# Patient Record
Sex: FEMALE | Race: ASIAN | Employment: FULL TIME | URBAN - METROPOLITAN AREA
[De-identification: names, ages, dates, MRNs, and addresses within clinical notes are randomized per-mention and may not be internally consistent; named-entity substitution may affect disease eponyms.]

---

## 2017-03-07 RX ORDER — METHIMAZOLE 5 MG/1
5 TABLET ORAL 2 TIMES DAILY
COMMUNITY

## 2017-03-07 RX ORDER — ACETAMINOPHEN 325 MG/1
325 TABLET ORAL
COMMUNITY

## 2017-03-07 RX ORDER — CETIRIZINE HCL 10 MG
10 TABLET ORAL DAILY
COMMUNITY

## 2017-03-07 NOTE — PERIOP NOTES
[facility]  Ambulatory Surgery Unit  Pre-operative Instructions    Surgery/Procedure Date  03/13/2017            Tentative Arrival Time 0700      1. On the day of your surgery/procedure, please report to the Ambulatory Surgery Unit Registration Desk and sign in at your designated time. The Ambulatory Surgery Unit is located in [facility] on the [facility] side of the [facility] across from the [address] [place] [address]. Please have all of your health insurance cards and a photo ID. 2. You must have someone with you to drive you home, as you should not drive a car for 24 hours following anesthesia. Please make arrangements for a responsible adult friend or family member to stay with you for at least the first 24 hours after your surgery. 3. Do not have anything to eat or drink (including water, gum, mints, coffee, juice) after midnight   03/12/2017  . This may not apply to medications prescribed by your physician. (Please note below the special instructions with medications to take the morning of surgery, if applicable.)    4. We recommend you do not drink any alcoholic beverages for 24 hours before and after your surgery. 5. Stop all Aspirin and non-steroidal anti-inflammatory drugs (i.e. Advil, Aleve) as directed by your surgeon's office. Stop all vitamins and herbal supplements seven days prior to your surgery. If you are currently taking Plavix, Coumadin, or other blood-thinning agents, contact your surgeon for instructions. 6. In an effort to help prevent surgical site infection, we ask that you shower with an anti-bacterial soap (i.e. Dial or Safeguard) for 3 days prior to and on the morning of surgery, using a fresh towel after each shower. Do not apply any lotions, powders or deodorants after the shower on the day of your procedure. If applicable, please do not shave the operative site for 48 hours prior to surgery. 7. Wear comfortable clothes.   Wear glasses instead of contacts. Do not bring any money or jewelry. Do not wear make-up, particularly mascara, the morning of your surgery. Do not wear nail polish, particularly if you are having foot /hand surgery. Wear your hair loose or down, no ponytails, buns, ryne pins or clips. All body piercings must be removed. 8. You should understand that if you do not follow these instructions your surgery may be cancelled. If your physical condition changes (i.e. fever, cold or flu) please contact your surgeon as soon as possible. 9. It is important that you be on time. If a situation occurs where you may be late, or if you have any questions or problems, please call [phone].    10. Your surgery time may be subject to change. You will receive a phone call the day prior to surgery to confirm your arrival time. 11. Pediatric patients: please bring a change of clothes, diapers, bottle/sippy cup, pacifier, etc.      Special Instructions:    MEDICATIONS TO TAKE THE MORNING OF SURGERY WITH A SIP OF WATER: Zyrtec, Tapazole      I understand a pre-operative phone call will be made to verify my surgery time. In the event that I am not available, I give permission for a message to be left on my answering service and/or with another person? yes         ___________________      ___________________  _________  Pt and spouse verbalized understanding of preop instructions via telephone.   (Signature of Patient)          (Witness)                              (Date and Time)

## 2017-03-10 ENCOUNTER — ANESTHESIA EVENT (OUTPATIENT)
Dept: SURGERY | Age: 47
End: 2017-03-10
Payer: COMMERCIAL

## 2017-03-11 PROBLEM — D25.9 LEIOMYOMA OF BODY OF UTERUS: Status: ACTIVE | Noted: 2017-03-11

## 2017-03-11 PROBLEM — R97.1 ELEVATED CA-125: Status: ACTIVE | Noted: 2017-03-11

## 2017-03-11 PROBLEM — R10.2 PELVIC PAIN IN FEMALE: Status: ACTIVE | Noted: 2017-03-11

## 2017-03-12 NOTE — H&P
Visit Type:  preop  Primary Provider:  Chang Monsalve MD    CC:  preop for Laparoscopy and Left salpingoophorectomy and right salpingectomy. History of Present Illness:  50year old presents for pre op for Laparoscopy, Left salpingoophorectomy and right salpingectomy. Has  had some LLQ pain recently with menses. Had spotting in January and no period in February. Plan and risk complications and inability to perform surgery as posted discussed at length through [place]  \"[facility]ortencia\". NKA to meds  Accepts blood transfusion if needed  Agrees to photos      Vital Signs   50Years Old Female  Height:  62.5 inches  Weight: 116.7 pounds  BP:       118/74    Past Pregnancy History   : 0  Para:     0  Aborta:  0  Term: 0, Premature: 0, Living Children: 0, Vaginal Deliveries: 0, C-Sections: 0, Elect. Ab: 0, Ectopics: 0    Gynecologic History   Additional Menses Information: spotting in February  Does patient have any problems with urine leakage? no  Does patient have any other bladder problems? no  History of abnormal pap: no  Gardasil Injection History: Not Applicable  Pt currently sexually active: yes  Current Contraception: None. History of STD: no     Allergies    This patient has no known allergies. 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 Occasional LLQ pain      General Medical Physical Exam:     General Appearance:       well developed, well nourished, in no acute distress    Head: Inspection:  normocephalic without obvious abnormalities    Eyes:        External:  EOM intact    Ears, Nose, Throat:        External:  normocephalic and atraumatic       Hearing:  grossly intact    Neck:        Neck:  supple; no masses; trachea midline       Thyroid:  no nodules, masses, tenderness, or enlargement    Respiratory:        Resp. effort:  no use of accessory muscles       Auscultation:   no rales, rhonchi, or wheezes    Chest Wall:       Breast (insp.): chronic inverted right nipple       Breast (palp.): no masses or tenderness    Cardiovascular:        Auscultation:   normal S1 and  S2; no murmur, rub, or gallop       Peripheral circ: no cyanosis, clubbing, or edema    Gastrointestinal:        Abdomen:  soft and non-tender with normal bowel sounds; no masses       Liver/spleen:   ; no enlargement or nodularity       Hernia:  no hernias    Genitourinary:        Ext. genitalia: normal appearance; no lesions or discharge       Urethra:  no discharge       Bladder:  no cystocele       Vagina:  normal appearing without lesions or discharge       Cervix:  normal appearance; no lesions or discharge       Uterus:  normal size and position; no masses       Adnexa:  left adnexal fullness    Musculoskeletal:        Gait/station:  normal gait    Lymphatic:        Axilla:  no axillary adenopathy       Inguinal:  no inguinal adenopathy    Skin:        Inspection:  no rashes, suspicious lesions, or ulcerations    Neurological:        Other:  grossly intact    Psychiatric:       Orientation:  oriented to time, place, and person              Impression & Recommendations:    Problem # 1:  Abdominal pain LLQ (ICD-789.04) (JZS57-U10.32)  Patient was thoroughly informed of the rationale for surgery, the expectations, probabilities of success, alternatives including no interventions, the inherent and unexpected risks including inability to perform procedure as posted, failure rate, relative expense and time out of normal activities,has made an informed decision and desires to proceed. Permit obtained times two. Plan is for laparoscopic left salpingoophorectomy and right salpingectomy   No clinical indication for hysterectomy and patient desires to leave right ovary in. Orders:  Pre-Op Exam (CPT-PO)      Problem # 2:  Abnormal tumor markers elevated  (ICD-795.82) (RCQ71-K72.1)  Borderline elevated CA-125  Suspect related to leiomyomatous uterus    Problem # 3:  Leiomyoma (ICD-218.9) (ARO33-U04.9)  Small leiomyomas noted on prior US    Medications (at conclusion of this visit)    09/20/2016 * THYROID MED Prescribed by non [id number] Nelsy Ferro MD          Electronically signed by Zeina Arevalo MD on 03/11/2017 at 8:38 PM    ________________________________________________________________________  Date of Surgery Update:  Blossom Jones was seen and examined. History and physical has been reviewed. The patient has been examined.  There have been no significant clinical changes since the completion of the originally dated History and Physical.  Permit signed  UPT negative  Plan confirmed with the use of the [place]  this am  Ready for surgery    Signed By: Zeina Arevalo MD     March 13, 2017 8:08 AM

## 2017-03-13 ENCOUNTER — ANESTHESIA (OUTPATIENT)
Dept: SURGERY | Age: 47
End: 2017-03-13
Payer: COMMERCIAL

## 2017-03-13 ENCOUNTER — HOSPITAL ENCOUNTER (OUTPATIENT)
Age: 47
Setting detail: OUTPATIENT SURGERY
Discharge: HOME | End: 2017-03-13
Attending: OBSTETRICS & GYNECOLOGY | Admitting: OBSTETRICS & GYNECOLOGY
Payer: COMMERCIAL

## 2017-03-13 VITALS
RESPIRATION RATE: 14 BRPM | SYSTOLIC BLOOD PRESSURE: 110 MMHG | WEIGHT: 115 LBS | TEMPERATURE: 97.5 F | HEART RATE: 76 BPM | HEIGHT: 63 IN | DIASTOLIC BLOOD PRESSURE: 69 MMHG | BODY MASS INDEX: 20.38 KG/M2 | OXYGEN SATURATION: 100 %

## 2017-03-13 LAB — HCG UR QL: NEGATIVE

## 2017-03-13 PROCEDURE — 76030000001 HC AMB SURG OR TIME 1 TO 1.5: Performed by: OBSTETRICS & GYNECOLOGY

## 2017-03-13 PROCEDURE — 77030020255 HC SOL INJ LR 1000ML BG: Performed by: OBSTETRICS & GYNECOLOGY

## 2017-03-13 PROCEDURE — 77030020061 HC IV BLD WRMR ADMIN SET 3M -B: Performed by: ANESTHESIOLOGY

## 2017-03-13 PROCEDURE — 77030018836 HC SOL IRR NACL ICUM -A: Performed by: OBSTETRICS & GYNECOLOGY

## 2017-03-13 PROCEDURE — 77030008756 HC TU IRR SUC STRY -B: Performed by: OBSTETRICS & GYNECOLOGY

## 2017-03-13 PROCEDURE — 74011000250 HC RX REV CODE- 250

## 2017-03-13 PROCEDURE — 77030002933 HC SUT MCRYL J&J -A: Performed by: OBSTETRICS & GYNECOLOGY

## 2017-03-13 PROCEDURE — 77030035051: Performed by: OBSTETRICS & GYNECOLOGY

## 2017-03-13 PROCEDURE — 74011250636 HC RX REV CODE- 250/636

## 2017-03-13 PROCEDURE — 77030019908 HC STETH ESOPH SIMS -A: Performed by: ANESTHESIOLOGY

## 2017-03-13 PROCEDURE — 77030011640 HC PAD GRND REM COVD -A: Performed by: OBSTETRICS & GYNECOLOGY

## 2017-03-13 PROCEDURE — 74011000250 HC RX REV CODE- 250: Performed by: ANESTHESIOLOGY

## 2017-03-13 PROCEDURE — 77030010507 HC ADH SKN DERMBND J&J -B: Performed by: OBSTETRICS & GYNECOLOGY

## 2017-03-13 PROCEDURE — 77030026438 HC STYL ET INTUB CARD -A: Performed by: ANESTHESIOLOGY

## 2017-03-13 PROCEDURE — 74011250636 HC RX REV CODE- 250/636: Performed by: ANESTHESIOLOGY

## 2017-03-13 PROCEDURE — 76210000034 HC AMBSU PH I REC 0.5 TO 1 HR: Performed by: OBSTETRICS & GYNECOLOGY

## 2017-03-13 PROCEDURE — 77030033639 HC SHR ENDO COAG HARM 36 J&J -E: Performed by: OBSTETRICS & GYNECOLOGY

## 2017-03-13 PROCEDURE — 77030005538 HC CATH URETH FOL44 BARD -B: Performed by: OBSTETRICS & GYNECOLOGY

## 2017-03-13 PROCEDURE — 77030008684 HC TU ET CUF COVD -B: Performed by: ANESTHESIOLOGY

## 2017-03-13 PROCEDURE — 77030002888 HC SUT CHRMC J&J -A: Performed by: OBSTETRICS & GYNECOLOGY

## 2017-03-13 PROCEDURE — 77030035048 HC TRCR ENDOSC OPTCL COVD -B: Performed by: OBSTETRICS & GYNECOLOGY

## 2017-03-13 PROCEDURE — 76060000062 HC AMB SURG ANES 1 TO 1.5 HR: Performed by: OBSTETRICS & GYNECOLOGY

## 2017-03-13 PROCEDURE — 77030031139 HC SUT VCRL2 J&J -A: Performed by: OBSTETRICS & GYNECOLOGY

## 2017-03-13 PROCEDURE — 81025 URINE PREGNANCY TEST: CPT

## 2017-03-13 PROCEDURE — 77030009852 HC PCH RTVR ENDOSC COVD -B: Performed by: OBSTETRICS & GYNECOLOGY

## 2017-03-13 PROCEDURE — 77030035220 HC TRCR ENDOSC BLNTPRT ANCHR COVD -B: Performed by: OBSTETRICS & GYNECOLOGY

## 2017-03-13 PROCEDURE — 77030008771 HC TU NG SALEM SUMP -A: Performed by: ANESTHESIOLOGY

## 2017-03-13 PROCEDURE — 76210000050 HC AMBSU PH II REC 0.5 TO 1 HR: Performed by: OBSTETRICS & GYNECOLOGY

## 2017-03-13 PROCEDURE — 77030013079 HC BLNKT BAIR HGGR 3M -A: Performed by: ANESTHESIOLOGY

## 2017-03-13 PROCEDURE — 74011000250 HC RX REV CODE- 250: Performed by: OBSTETRICS & GYNECOLOGY

## 2017-03-13 RX ORDER — SODIUM CHLORIDE, SODIUM LACTATE, POTASSIUM CHLORIDE, CALCIUM CHLORIDE 600; 310; 30; 20 MG/100ML; MG/100ML; MG/100ML; MG/100ML
25 INJECTION, SOLUTION INTRAVENOUS CONTINUOUS
Status: DISCONTINUED | OUTPATIENT
Start: 2017-03-13 | End: 2017-03-13 | Stop reason: HOSPADM

## 2017-03-13 RX ORDER — IBUPROFEN 400 MG/1
400 TABLET ORAL
Qty: 30 TAB | Refills: 1 | Status: SHIPPED | OUTPATIENT
Start: 2017-03-13

## 2017-03-13 RX ORDER — FENTANYL CITRATE 50 UG/ML
25 INJECTION, SOLUTION INTRAMUSCULAR; INTRAVENOUS
Status: DISCONTINUED | OUTPATIENT
Start: 2017-03-13 | End: 2017-03-13 | Stop reason: HOSPADM

## 2017-03-13 RX ORDER — MORPHINE SULFATE 10 MG/ML
2 INJECTION, SOLUTION INTRAMUSCULAR; INTRAVENOUS
Status: DISCONTINUED | OUTPATIENT
Start: 2017-03-13 | End: 2017-03-13 | Stop reason: HOSPADM

## 2017-03-13 RX ORDER — PROPOFOL 10 MG/ML
INJECTION, EMULSION INTRAVENOUS AS NEEDED
Status: DISCONTINUED | OUTPATIENT
Start: 2017-03-13 | End: 2017-03-13 | Stop reason: HOSPADM

## 2017-03-13 RX ORDER — SODIUM CHLORIDE 0.9 % (FLUSH) 0.9 %
5-10 SYRINGE (ML) INJECTION AS NEEDED
Status: DISCONTINUED | OUTPATIENT
Start: 2017-03-13 | End: 2017-03-13 | Stop reason: HOSPADM

## 2017-03-13 RX ORDER — FENTANYL CITRATE 50 UG/ML
INJECTION, SOLUTION INTRAMUSCULAR; INTRAVENOUS AS NEEDED
Status: DISCONTINUED | OUTPATIENT
Start: 2017-03-13 | End: 2017-03-13 | Stop reason: HOSPADM

## 2017-03-13 RX ORDER — KETOROLAC TROMETHAMINE 30 MG/ML
INJECTION, SOLUTION INTRAMUSCULAR; INTRAVENOUS AS NEEDED
Status: DISCONTINUED | OUTPATIENT
Start: 2017-03-13 | End: 2017-03-13 | Stop reason: HOSPADM

## 2017-03-13 RX ORDER — OXYCODONE AND ACETAMINOPHEN 5; 325 MG/1; MG/1
1-2 TABLET ORAL
Qty: 15 TAB | Refills: 0 | Status: SHIPPED | OUTPATIENT
Start: 2017-03-13

## 2017-03-13 RX ORDER — MIDAZOLAM HYDROCHLORIDE 1 MG/ML
INJECTION, SOLUTION INTRAMUSCULAR; INTRAVENOUS AS NEEDED
Status: DISCONTINUED | OUTPATIENT
Start: 2017-03-13 | End: 2017-03-13 | Stop reason: HOSPADM

## 2017-03-13 RX ORDER — NEOSTIGMINE METHYLSULFATE 1 MG/ML
INJECTION INTRAVENOUS AS NEEDED
Status: DISCONTINUED | OUTPATIENT
Start: 2017-03-13 | End: 2017-03-13 | Stop reason: HOSPADM

## 2017-03-13 RX ORDER — HYDROMORPHONE HYDROCHLORIDE 1 MG/ML
.2-.5 INJECTION, SOLUTION INTRAMUSCULAR; INTRAVENOUS; SUBCUTANEOUS ONCE
Status: DISCONTINUED | OUTPATIENT
Start: 2017-03-13 | End: 2017-03-13 | Stop reason: HOSPADM

## 2017-03-13 RX ORDER — GLYCOPYRROLATE 0.2 MG/ML
INJECTION INTRAMUSCULAR; INTRAVENOUS AS NEEDED
Status: DISCONTINUED | OUTPATIENT
Start: 2017-03-13 | End: 2017-03-13 | Stop reason: HOSPADM

## 2017-03-13 RX ORDER — BUPIVACAINE HYDROCHLORIDE 2.5 MG/ML
INJECTION, SOLUTION EPIDURAL; INFILTRATION; INTRACAUDAL AS NEEDED
Status: DISCONTINUED | OUTPATIENT
Start: 2017-03-13 | End: 2017-03-13 | Stop reason: HOSPADM

## 2017-03-13 RX ORDER — SODIUM CHLORIDE 0.9 % (FLUSH) 0.9 %
5-10 SYRINGE (ML) INJECTION EVERY 8 HOURS
Status: DISCONTINUED | OUTPATIENT
Start: 2017-03-13 | End: 2017-03-13 | Stop reason: HOSPADM

## 2017-03-13 RX ORDER — DEXAMETHASONE SODIUM PHOSPHATE 4 MG/ML
INJECTION, SOLUTION INTRA-ARTICULAR; INTRALESIONAL; INTRAMUSCULAR; INTRAVENOUS; SOFT TISSUE AS NEEDED
Status: DISCONTINUED | OUTPATIENT
Start: 2017-03-13 | End: 2017-03-13 | Stop reason: HOSPADM

## 2017-03-13 RX ORDER — LIDOCAINE HYDROCHLORIDE 20 MG/ML
INJECTION, SOLUTION EPIDURAL; INFILTRATION; INTRACAUDAL; PERINEURAL AS NEEDED
Status: DISCONTINUED | OUTPATIENT
Start: 2017-03-13 | End: 2017-03-13 | Stop reason: HOSPADM

## 2017-03-13 RX ORDER — DIPHENHYDRAMINE HYDROCHLORIDE 50 MG/ML
12.5 INJECTION, SOLUTION INTRAMUSCULAR; INTRAVENOUS AS NEEDED
Status: DISCONTINUED | OUTPATIENT
Start: 2017-03-13 | End: 2017-03-13 | Stop reason: HOSPADM

## 2017-03-13 RX ORDER — OXYCODONE AND ACETAMINOPHEN 5; 325 MG/1; MG/1
1 TABLET ORAL ONCE
Status: DISCONTINUED | OUTPATIENT
Start: 2017-03-13 | End: 2017-03-13 | Stop reason: HOSPADM

## 2017-03-13 RX ORDER — ROCURONIUM BROMIDE 10 MG/ML
INJECTION, SOLUTION INTRAVENOUS AS NEEDED
Status: DISCONTINUED | OUTPATIENT
Start: 2017-03-13 | End: 2017-03-13 | Stop reason: HOSPADM

## 2017-03-13 RX ORDER — ONDANSETRON 2 MG/ML
INJECTION INTRAMUSCULAR; INTRAVENOUS AS NEEDED
Status: DISCONTINUED | OUTPATIENT
Start: 2017-03-13 | End: 2017-03-13 | Stop reason: HOSPADM

## 2017-03-13 RX ORDER — LIDOCAINE HYDROCHLORIDE 10 MG/ML
0.1 INJECTION, SOLUTION EPIDURAL; INFILTRATION; INTRACAUDAL; PERINEURAL AS NEEDED
Status: DISCONTINUED | OUTPATIENT
Start: 2017-03-13 | End: 2017-03-13 | Stop reason: HOSPADM

## 2017-03-13 RX ADMIN — DEXAMETHASONE SODIUM PHOSPHATE 8 MG: 4 INJECTION, SOLUTION INTRA-ARTICULAR; INTRALESIONAL; INTRAMUSCULAR; INTRAVENOUS; SOFT TISSUE at 08:41

## 2017-03-13 RX ADMIN — PROPOFOL 20 MG: 10 INJECTION, EMULSION INTRAVENOUS at 08:58

## 2017-03-13 RX ADMIN — MEPERIDINE HYDROCHLORIDE 12.5 MG: 25 INJECTION, SOLUTION INTRAMUSCULAR; INTRAVENOUS; SUBCUTANEOUS at 09:36

## 2017-03-13 RX ADMIN — LIDOCAINE HYDROCHLORIDE 0.1 ML: 10 INJECTION, SOLUTION EPIDURAL; INFILTRATION; INTRACAUDAL; PERINEURAL at 07:57

## 2017-03-13 RX ADMIN — ROCURONIUM BROMIDE 5 MG: 10 INJECTION, SOLUTION INTRAVENOUS at 08:17

## 2017-03-13 RX ADMIN — GLYCOPYRROLATE 0.4 MG: 0.2 INJECTION INTRAMUSCULAR; INTRAVENOUS at 08:53

## 2017-03-13 RX ADMIN — ONDANSETRON 4 MG: 2 INJECTION INTRAMUSCULAR; INTRAVENOUS at 08:53

## 2017-03-13 RX ADMIN — LIDOCAINE HYDROCHLORIDE 60 MG: 20 INJECTION, SOLUTION EPIDURAL; INFILTRATION; INTRACAUDAL; PERINEURAL at 08:17

## 2017-03-13 RX ADMIN — PROPOFOL 80 MG: 10 INJECTION, EMULSION INTRAVENOUS at 08:17

## 2017-03-13 RX ADMIN — NEOSTIGMINE METHYLSULFATE 3 MG: 1 INJECTION INTRAVENOUS at 08:53

## 2017-03-13 RX ADMIN — KETOROLAC TROMETHAMINE 30 MG: 30 INJECTION, SOLUTION INTRAMUSCULAR; INTRAVENOUS at 08:53

## 2017-03-13 RX ADMIN — FENTANYL CITRATE 50 MCG: 50 INJECTION, SOLUTION INTRAMUSCULAR; INTRAVENOUS at 08:58

## 2017-03-13 RX ADMIN — PROPOFOL 50 MG: 10 INJECTION, EMULSION INTRAVENOUS at 09:02

## 2017-03-13 RX ADMIN — SODIUM CHLORIDE, SODIUM LACTATE, POTASSIUM CHLORIDE, AND CALCIUM CHLORIDE 25 ML/HR: 600; 310; 30; 20 INJECTION, SOLUTION INTRAVENOUS at 07:57

## 2017-03-13 RX ADMIN — SODIUM CHLORIDE, SODIUM LACTATE, POTASSIUM CHLORIDE, AND CALCIUM CHLORIDE 25 ML/HR: 600; 310; 30; 20 INJECTION, SOLUTION INTRAVENOUS at 09:47

## 2017-03-13 RX ADMIN — FENTANYL CITRATE 50 MCG: 50 INJECTION, SOLUTION INTRAMUSCULAR; INTRAVENOUS at 08:17

## 2017-03-13 RX ADMIN — MIDAZOLAM HYDROCHLORIDE 2 MG: 1 INJECTION, SOLUTION INTRAMUSCULAR; INTRAVENOUS at 08:09

## 2017-03-13 RX ADMIN — MEPERIDINE HYDROCHLORIDE 12.5 MG: 25 INJECTION, SOLUTION INTRAMUSCULAR; INTRAVENOUS; SUBCUTANEOUS at 09:46

## 2017-03-13 RX ADMIN — ROCURONIUM BROMIDE 25 MG: 10 INJECTION, SOLUTION INTRAVENOUS at 08:18

## 2017-03-13 NOTE — OP NOTES
[address]   OP NOTE       Name:  Alin Srinivasan   MR#:  [id number]   :  1970   Account #:  [de-identified]    Surgery Date:  2017   Date of Adm:  2017       PREOPERATIVE DIAGNOSES:   1. Chronic left adnexal pain. 2. Dyspareunia. POSTOPERATIVE DIAGNOSES:   1. Chronic left adnexal pain. 2. Dyspareunia. 3. Evidence of endometriosis. PROCEDURES PERFORMED: Diagnostic laparoscopy. SURGEON: Mani Karimi MD     ASSISTANT: Kam Monsalve MD  ANESTHESIA: General.    ESTIMATED BLOOD LOSS: Minimal.    SPECIMENS REMOVED: None. FINDINGS: At time of laparoscopy, there was a normal-appearing   uterus. There were hemorrhagic implants along the bladder flap seen,   pulling the bladder flap up toward the anterior aspect of the uterus. There were significant omental and bowel adhesions completely   covering and precluding visualization of the left adnexa. There was a   minimal view of the right tube, which was dilated, consistent with   Old hydrosalpinx. . The right ovary was not seen. The liver edge was visualized   and normal. Photos were taken of the intraoperative findings. .     DESCRIPTION OF PROCEDURE: Once all permits were obtained and   proper patient identification, the patient was taken to the operating   room at [facility]. She underwent general   endotracheal anesthesia, prepped and draped in usual sterile manner   for laparoscopic surgery. Arms were tucked and padded and placed to   the side. Sequential compression stockings were placed. The legs   were placed in [address] [place] [address]. Once all prepping and draping was done,   a time-out was performed to identify the patient, indication for   procedure and no need for beta blockers or antibiotics. Following this,   a Ryan catheter was placed in the bladder for continuous bladder   drainage.  The acorn retractor was placed into the uterus for uterine   manipulation and then the gloves were changed. Our attention was   placed abdominally. An infraumbilical skin incision was made through the skin down to the   fascia. The fascia was elevated between Kocher clamps and incised   directly. Posterior peritoneum was elevated, grasped between Gerda   clamps and incised. Stay stitches of 0 Vicryl were placed in the angles   and the Aidan trocar was placed. Pneumoperitoneum was then   developed. Once the trocar was placed and the laparoscope was   placed, visualization was then noted. Thorough evaluation of this area   revealed the findings above. The patient had previously been   counseled preoperatively that if there were signs of significant   endometriosis, I felt that definitive surgical therapy would be indicated,   requiring bilateral tubes and ovaries and the uterus to be taken out,   which indeed was present, that we would take photos then complete   the operation. Visualization revealed the findings of normal uterus, but   there were implants of hemorrhagic appearance consistent with   endometriosis which pulled the bladder flap anteriorly. There were   adhesions of the omentum and the bowel and the left adnexal sidewall   which precluded visualization of the round ligaments and the entire left   adnexa. A minimal view of the right tube, which revealed an old   hydrosalpinx, and the right ovary could not be seen due to the   adhesions. The liver was visualized and appeared to be normal.   Photos were obtained and because of significant endometriosis   present, I feel that she would be best served by having the surgical   therapy which needed to be performed in the future. At this point, the operation was felt to be completed. The 5 mm trocars   were removed. The laparoscope was removed. Pneumoperitoneum   was relieved. Positive pressure ventilation was given. Umbilical fascial   incision was closed with 0 Vicryl running stitch. Skin incision was   reapproximated with Monocryl. All trocar sites were infiltrated with a   total of 20 mL of 1% lidocaine. Dermabond was applied to the left   lower trocar incision and a stitch was placed in the right lower trocar. Ryan catheter was removed. The acorn retractor was removed. There   was bleeding from the cervical laceration which was corrected with a 0   Vicryl stitch. At the conclusion of the operation, there was no excessive   bleeding. PLAN: The patient was to be taken directly from the operating room to   the recovery room in stable condition to be observed until stable and   discharged home. Discharge medications were to include ibuprofen   and Percocet. She will be seen back in the office in approximately 1   week, at which time we will discuss possible medical treatment versus   definitive surgical therapy.          MD ROSALIND Real / DEVON   D:  03/13/2017   09:21   T:  03/13/2017   10:39   Job #:  [id number]

## 2017-03-13 NOTE — PERIOP NOTES
PACU~  Pt received to PACU, sleepy. VSS. No bleeding, abd soft. 4996- Using Faces scale pt c/o pain 6/10. Admin demerol  [id number]- Using faces scale, pt states pain is about the same. Admin demerol. 4362- Pt resting with eyes closed. Dr Teresa Holden came to bedside. 0958-Using faces scale pt states pain is feeling a little better, 4/10. Abd soft/flat, no bleeding on peripad  1005--HOB up, pt drinking apple juice.  here. Discharge instructions printed in [address] [place] [address] [employer] (using Google Translate). Started to us [employer] phone,  refused. Pt said it was ok. Reviewed instructions & prescriptions. 21 - Also gave pt info on endometriosis & hysterectomy. Gave her written results in [facility], using Google translate. 1020-Pt states she is feeling ok, using faces scale, 4/10. Pt and  said they are ready to go home. 1025- PIV removed. Assisted pt in dressing. Abd soft & flat. No vaginal bleeding. 1036- Gait steady with minimal assist. Pt denies any complaints. Discharge pt home with  via w/c to car.

## 2017-03-13 NOTE — PERIOP NOTES
Patient speaks [place] is[employer]; the language line was used for the pre-op interview.  # [id number]. Dr. Ramiro Short and Dr. Viky Ochoa both used the  line pre-op as well.

## 2017-03-13 NOTE — IP AVS SNAPSHOT
[address] [facility] 
[phone] Patient: Kyle Garrett MRN: [id number] :1970 You are allergic to the following No active allergies Recent Documentation Height Weight BMI OB Status Smoking Status 1.6 m 52.2 kg 20.37 kg/m2 Postmenopausal Never Smoker Emergency Contacts Name Discharge Info Relation Home Work Mobile Gray Smith  Spouse [3]   [phone] About your hospitalization You were admitted on:  2017 You last received care in the:  [facility] PACU You were discharged on:  2017 Unit phone number:  [phone] Why you were hospitalized Your primary diagnosis was:  Pelvic Pain In Female Your diagnoses also included:  Leiomyoma Of Body Of Uterus, Elevated Ca-125 Providers Seen During Your Hospitalizations Provider Role Specialty Primary office phone Khushi Patterson MD Attending Provider Obstetrics & Gynecology [phone] Your Primary Care Physician (PCP) Primary Care Physician Office Phone Office Fax NONE ** None ** ** None ** Follow-up Information Follow up With Details Comments Contact Info None   None ([phone]) Patient stated that they have no PCP 
  
 TEREZA Cuellar MD Follow up in 1 week(s) Current Discharge Medication List  
  
START taking these medications Dose & Instructions Dispensing Information Comments Morning Noon Evening Bedtime  
 ibuprofen 400 mg tablet Commonly known as:  MOTRIN Your next dose is: Today, Tomorrow Other:  _________ Dose:  400 mg Take 1 Tab by mouth every six (6) hours as needed for Pain. Quantity:  30 Tab Refills:  1  
     
   
   
   
  
 oxyCODONE-acetaminophen 5-325 mg per tablet Commonly known as:  PERCOCET Your next dose is: Today, Tomorrow Other:  _________ Dose:  1-2 Tab Take 1-2 Tabs by mouth every four (4) hours as needed for Pain. Max Daily Amount: 12 Tabs. Quantity:  15 Tab Refills:  0 CONTINUE these medications which have NOT CHANGED Dose & Instructions Dispensing Information Comments Morning Noon Evening Bedtime  
 methIMAzole 5 mg tablet Commonly known as:  TAPAZOLE Your next dose is: Today, Tomorrow Other:  _________ Dose:  5 mg Take 5 mg by mouth two (2) times a day. Indications: hyperthyroidism Refills:  0  
     
   
   
   
  
 TYLENOL 325 mg tablet Generic drug:  acetaminophen Your next dose is: Today, Tomorrow Other:  _________ Dose:  325 mg Take 325 mg by mouth every four (4) hours as needed for Pain. Indications: Pain Refills:  0 ZyrTEC 10 mg tablet Generic drug:  cetirizine Your next dose is: Today, Tomorrow Other:  _________ Dose:  10 mg Take 10 mg by mouth daily. Indications: ALLERGIC RHINITIS Refills:  0 Where to Get Your Medications Information on where to get these meds will be given to you by the nurse or doctor. ! Ask your nurse or doctor about these medications  
  ibuprofen 400 mg tablet  
 oxyCODONE-acetaminophen 5-325 mg per tablet Discharge Instructions After Care Instructions For Your Laparoscopy 1. You may resume your usual diet once the nausea resolves. Initially, try sips of warm fluids and a bland diet. 2. Avoid heavy lifting or straining. Gradually increase your activity. First try walking and doing light activity around the house. You may resume your normal habits if no significant discomfort or bleeding develops. Most women can return to work within 2-7 days after this procedure. 3. Sexual intercourse can be resumed as soon as desired provided the discomfort following surgery has subsided. 4. You may take showers or tub baths. It is also safe to swim or use hot tubs once you feel up to it. 5. Some lower abdominal cramping typically occurs after this procedure. Tylenol, Motrin or your prescribed pain medication should relieve this discomfort. You should notice steady improvement in the pain over the next day or so. It is also not unusual to experience some discomfort under your ribs or to notice neck or shoulder soreness. This is due to gas used during the surgery to help the physicians see your pelvic organs. The gas is reabsorbed over a 24 hour course. 6. It is not unusual to have vaginal spotting lasting 2-3 days. It is difficult to predict when your next menstrual period will occur as your body has undergone a major stress. Your period should regulate itself within the first 6 weeks post-op. 7. A bruise may appear around the incision and will gradually resolve as it heals. 8. The suture used to close the incision may be visible above the skin. If so, it will be removed at your office visit. However, frequently sutures are placed below the skin and will reabsorb on their own. There will be no need for removal.  
 
9. Call the office at [phone] to report any of the following problems: Abdominal pain that is increasing in severity, heavy vaginal bleeding, drainage or separation of your incision site, temperature greater than 100.4 or persistent nausea and vomiting. 10. You should be seen in the office following your surgery. Call for an appointment if this has not already been arranged. At this appointment, the findings noted at the time of your procedure will be discussed. 11. You may remove the dressing from your incision after 12 hours. You received Toradol during your surgery.  You may not take any form of NSAIDS (non steroidal anti inflammatory drugs) such as Advil, Ibuprofen, Aleve, Motrin until 2:30pm. 
 
 
 DO NOT TAKE TYLENOL/ACETAMINOPHEN WITH PERCOCET TAKE NARCOTIC PAIN MEDICATIONS WITH FOOD Narcotics tend to be constipating, we suggest taking a stool softener such as Colace or Miralax (follow package instructions). DO NOT DRIVE WHILE TAKING NARCOTIC PAIN MEDICATIONS. DO NOT TAKE SLEEPING MEDICATIONS OR ANTIANXIETY MEDICATIONS WHILE TAKING NARCOTIC PAIN MEDICATIONS,  ESPECIALLY THE NIGHT OF ANESTHESIA. CPAP PATIENTS BE SURE TO WEAR MACHINE WHENEVER NAPPING OR SLEEPING. DISCHARGE SUMMARY from Nurse The following personal items collected during your admission are returned to you:  
Dental Appliance: Dental Appliances: None Vision: Visual Aid: Glasses Hearing Aid:   
Jewelry: Jewelry: None Clothing: Clothing: Other (comment) (belonging bag) Other Valuables: Other Valuables: Purse (given to ) Valuables sent to safe:   
 
 
PATIENT INSTRUCTIONS: 
 
 
F-face looks uneven A-arms unable to move or move even S-speech slurred or non-existent T-time-call 911 as soon as signs and symptoms begin-DO NOT go Back to bed or wait to see if you get better-TIME IS BRAIN. If you have not received your influenza and/or pneumococcal vaccine, please follow up with your primary care physician. The discharge information has been reviewed with the patient and spouse. The patient and spouse verbalized understanding. ??????????????????????????????????????????????????? 
 
 
??????????????????????????????????????????????????????????? ????????????????????????????????????? 
 
??????????????????????????? ??????????????????????? ????????????????????????? ? ???????? ?????????????????????????????????????????????????????????????????????????????????????????????? ????????????????????????????????????? 2-7 ???????????????????? 
 
???????????????????????????????????????????????????????????????????????????????????????????????? 
 
????????????????????????????????? ?????????????????????????????????????????????????????????????????????????????? 
 
?????????????????????????????????????????????????????????  Tylenol, Motrin ??????????????????????????????????????????? ?????????????????????????????????????????????????????? ????????????????????????????????????????????????????????????????????????????????? ??????????????????????????????????????????????????????????????????????????????????????? ??????????????????????? 24 ??????? 
 
?????????????????????????????????????????????????????????? 2-3 ??? ??????????????????????????????????????????????????????????????????????????????????????????????????????????????????? ?????????????????????????????????? 6 ?????????????????????????? 
 
????????????????????? ? ????????????????????????????????????? 
 
??????????????????????????????????????????????? ?????????????????????????????????????????????????? ??????????????????????????????????????????????????????????????????? ??????????????????????? 
 
???????????????? [phone] ?????????????????? ? ????????: ???????????????????????????????????, ????????????????????????, ??????????????????????????????????, ??????????????? 100.4 ???????????????????????????????????? 
 
????????????????????????????????????????? ??????????????????????????? ?????????????????????????????????????????????????????????????????????????????? 
 
???????????????????????????????????????????? 12 ??????? 
 
????????? Toradol ?????????????????? ???????????? NSAIDS (Non steroidal inflammatory drugs) ? ??? Advil, Ibuprofen, Aleve, Motrin ????????? 14.30 ?. 
 
 
??????? TYLENOL / ACETAMINOHEN ? ?? PERCOCET 
????????????????????????????????????????????????? 
??????????????????????????????????????????????????????????????????????? Colace ? ??? Miralax (????????????????????) 
 
?????????????????????????????????????????????????????????????????? 
 
?????????????????????????????????????????????????????????????????????????????????????????????????????????????????????????????????????????????????????? 
 
??????? CPAP ????????????????????????????????????????????????? 
 
?????????????????????? 
 
????????????????????????????????????????????????????????????????????????????????: 
???????????????: ??????????????????: ????? 
??????????: Visual Aid: Glasses ??????????????: 
?????????????: ?????????????: ????? 
????????: ????????: ???? ? (???????????????) (belong bag) ? ?????? ? : ???????????? ? : ??????????? (???????) 
??????????????????????: 
 
 
????????????????????: 
 
 ???????????????????????????????????????????????????????????????????????? 24 ????????????????????????????????????????????: 
????????????????????????????? 24 ??????? 
??????????????????????????????????????????????????????????????????? 
????? ????????????? 
???????????????: ?????????????????????????????????? ????????????????????????????????????????? 24 ???????????? 
???????????????????????????????????????????????????????????????????????? (??????????????????) ????????????????? 
????????????????????????????????????????: ?????????????????????????? ? ????????? ?????????????????????????????????????????????????????????????? 
???????????????????????????????????????? 
?????????????????????????????????????????? 
???????????????????????????? 
??????????????????????????? 8 ????????????????????????????????????????????? 
 
????????????????????????????????: 
????????????????????????????????????????? ? ???????????? 
??????????????? 100.5 
?????????????????????????????? 4 ??????????????????????????????? 
?????????????????????????????????????????????????????????????????: ?????????????????????????????????????????????????????????????????????????????? 
 
 
??????????????????? Operative Call ????????????????????????????????????????????????????????????? ??????????????????????????????????????????????????????????????? ???????????????????????????????????????????????????????????????????????????????????????????? 
 
?????????????????????????????????????? Press Ganey ?????????????????????????????????????????????????????? ???????????????????? valua Suggest an edit Kh? næan? n? k?r d?læ h?l?ng p?h??t?d d?wy kl?xngs? ?xngth?ngk?l k?hxng khu? 
 
 
khu? x?c kl?b m? r?bprath?n x?h??r t?m pkti d?? me???x x?k?r khl???ns??? h??y p? reìm t?n lxng d???m n?? xùn læa x?h??r x?xnyon 
 
h?l?k le??yng k?r yk h?r??x r?ng h?n?k kh?xy«pheìm kickrrm k?hxng khu? lxng dein læa th? kickrrm be? rxb «b? ?n k?xn khu? x?c reìm t?n nis? ?y pkti k?hxng khu? d?? h??k m??m? khw?m r?? s??k m?? s?b?y h?r??x m? le??xd h??l keid k?h??n x??ng m? n?y s??kh?? p?h??h??ing s??wn h???? s??m?rt?h kl?b p? th?ng?n p?h?yn? 2-7 w?n h?l?ngc?k k?h?n txn n?? 
 
k?r m? phe?? s? ? mph?n?h? ca d?nein t?x p? d?? doy r?w th??s?ud thè? th?? t?xngk?r h??k khw?m r?? s??k m?? s?b?y h?l?ng k?r p?h??t?d ld lng 
 
khu? x?c ch?? wel? x?b n?? h?r??x x??ngx?bn?? nxkc?k n?? y?ng plxdp?h?y th?? ca ??y n?? h?r??x ch?? x??ng n?? r?xn me???x khu? r??s??k t?h?ng s? ìng h?el?? n?? 
 
k?r ld takhriw n? ch?xng t?xng s??wn l??ng m?k keid k?h??n h?l?ngc?k k?h?n txn n?? Tylenol, Motrin h?r??x y? kæ? pwd th?? khu? k?h?nd khwr ld x?k?r m?? s?b?y n?? khu? khwr s??ngket h??n khw?m kh??b h?n?? n? k?r pwd me? ??xy n? w?n rùng k?h??n nxkc?k n?? y?ng m?? keid x?k?r m?? s?b?y t?m s?? khorng k?hxng khu? h?r??x s??ngket khw?m r?? s??k c?b khx h?r??x h??l? n?? p?n pher?a ??s th?? ch?? n? rah? ??ng k?r p?h??t?d phe???x ch?wy h? ?? phæthy? h??n xw?ywa n? xûng cheingkr?n k?hxng khu? ??s s??m?rt?h d?d s?m d?? tlxd 24 ch??wmong 
 
m?n m?? d?? p?n re? ??xng p?hid pkti th?? m? ch?xng khlxd cud c?næk p?n wel? n?n 2-3 w?n p?nk?r y?k th?? ca kh?d de? d?? me???x ch?wng wel? m? prac?de? ?xn khr?ng t?h?d p? ca keid k?h? ?n ne???xngc?k r??ngk?y k?hxng khu? d?? r?b khw?mkher?yd th?? s??kh?? gonzalo wel? k?hxng khu? khwr khwbkhum t?w xeng p?h?yn? 6 s??pd?h?? ræk h?l?ng k?r d?nein k?r 
 
rxy c?? x?c pr?kt? k?h??n rxb «rxy b?k læa ca kh?xy«kæ? p??h?? t?m th?? ye?ywy? rxy y?b th?? ch?? n? k?r pid rxy b?k x?c pr?kt? h?en? ?x p?hiwh?n?ng h??k p?n chèn n?n ca t?h?k n? xxk n? k?r k?hê? chm s??n?kng?n k?hxng khu? x??ngr?k?t?m rxy t?x th?? th? b?xy«ca xy?? t?? p?hiwh?n?ng læa ca d?d s?m kl?bkh??n m? xeng m?? c?p?n t?xng m? k?r k?c?d 
 
thor h?? th?? s??n?kng?n [phone] phe???x r?yng?n p??h?? d? «t?x p? n??: X?k?r pwd t?xngth? ? pheìm k?h??n x??ng runræng, m? le??xd xxk th?ng ch?xng khlxd m?k, k?r rab?y n?? h?r??x k?r yæk ph???nth??  k?hxng p?hæl, kaur?h?p?h?mi s? ?ng k?? 100.4 H?r??x x?k?r khl???ns??? x?ce? yn læa b?xy khr?ng 
 
khu? khwr ca d?? h??n n? xxffi?? t?m k?r p?h??t?d k?hxng khu? thor n?d h??k y?ng m?? d?? c?d ter?ym n? k?r n?dh?m?y n?? ca m? k?r kl??w t?h?ng p?hl k?r wic?y th?? rabu w?? n? ch?wng wel? k?hxng krabwnk?r k?hxng khu? 
 
khu? x?c t?hxd n?? s?l?d xxk c?k p?hæl k?hxng khu? h?l?ngc?k p?h??n p? 12 ch??wmong 
 
khu? d?? r?b Toradol n? rah? ??ng k?r p?h??t?d khu? m?? khwr ch?? NSAIDS (Non steroidal inflammatory drugs) chèn Advil, Ibuprofen, Aleve, Motrin cnt?h?ng wel? 14.30 N. X?? ch?? TYLENOL/ ACETAMINOHEN k?b PERCOCET 
ch?? y? r?ks? ?? rokh mar?ng n? kraphe?a x?h??r th?? m??m? prayochn? 
y? s?eph tid m? næwnôm th?? ca p?n x?k?r t?xngp?h?k re? k?hx næan? h??? ch?? n?? y? pr?b s? t?n chèn Colace h?r??x Miralax (th? t?m kh? næan? n? phækh kec) h???m r?bprath?n k?h?a th?? ch?? y? r?ks? ?? rokh mar?ng th?? ke??yw k?b rokh mar?ng p?hiwh?n?ng 
 
h???m ch?? y? r?ks? ?? rokh nxn h?l?b h?r??x y? r?ks? ?? rokh h?xbh???d n? k?h?a th??th?k?r r?ks??? d?wy y? r?ks? ?? rokh mar?ng th?? ke??yw k?b rokh mar?ng p?hiwh?n?ng doy c?heph?a x??ng yìng y?m kh??kh??n x?n ?? s?m??s?emx 
 
p?h?? p?wy CPAP khwr ch?? kher? ??xng me???x d? k?t?m th?? k?r phe?a h?r??x k?r nxn h?l?b 
 
s?rup s? ?ra s? ?kh?? c?k phy?b?l 
 
r?yk?r s??wn bukhkhl t?x p? n?? th?? rwbrwm rah? ??ng k?r r?b k?hê? re?yn k?hxng khu? ca t?h?k s??ng kl?b m?y?ng khu?: 
Cameron Alexis?? th?nt krrm: Shreyas Miu? ??xng ch?? th?nt krrm: M??m? 
wis? ?y th???n?: Visual Aid: Glasses 
kher? ??xng ch?wy f?ng: 
Shreyas Miu? ??xngprad? b: Shreyas Miu? ??xngprad?b: M??m? 
s?e? ??xp???: S?e???xp???: X???n « (s?ædng khw?m khidh??n) (belong bag) 
kh?? x???n « : K?hxng m? kh?? x???n « : Krap?? ngein (h??? s??m?) 
k? hxng m? kh?? s??ng p? y?ng t?? sef: 
 
 
Kh? næan? s??h?r?b p?h?? p?wy: 
 
H?l?ngc?k k?r rang?b khw?m r?? s??k th??wp? h?r??x k?r rang?b khw?m r?? s??k h?lxdle? ?xd d? p?n wel?  24 ch??wmong h?r??x n? k?h?a th?? ch?? y? s?eph tidt?m b?s???ng phæthy?: 
 Kh?r b?ng khn khwr xy?? k?b khu? t?x p? x?k 24 ch??wmong 
phe???x khw?m plxdp?h?y k?hxng khu? xeng p?h??h???? th?? m? khw?m r?bp?hidchxb t?xng k?h?b rt?h kl?b b??n 
c?k?d kickrrm k?hxng khu? 
kickrrm th?? næan?: Ph?k w?n n?? d?wy khw?m ch?wyh?el? ?x n? w?n n?? x?? p?n k?h? ?n b?nd? h?r??x x?b n?? doy m?? t?xng s??? n? 24 ch??wmong t?h?d p? 
pord reìm t?n d?wy x?h??r th?? nùmnwl x?xn nùm læa m? khw?m k??wh?n?? p?n th?? yxmr?b d?? (m??m? x?k?r khl???ns???) t?x x?h??r p?n prac? h??k khu? m? x?k?r c?b khx khu? khwr lxng s? ìng t?x p? n??: K?hxngh? elw h?r??x n??s??ms? ?ych? xùn «h?r??x khxh?xy h??k m?? d? k?h??n h?l?ngc?k p?h??n p? h?l?y w?n pord tidt?m p?hl k?b phæthy? h?l?k k?hxng khu? 
h???m k?h?b rt?h læa ch?? kher? ??xngc?kr th?? p?n x?ntr?y 
x?? th?k?r t?ds?inc? s? ? wnt?w h?r??x ?hurkic th?? s??kh?? 
x?? d???m kher? ??xng d???m xælkx?xl? 
h??k khu? y?ng m?? d?? p?s?s? ?wa p?h?yn? 8 ch??wmong h?l?ngkh lxd pord tidt?x ? ? ?lyphæthy? th?ng thor? ??phth? r?yng?n t?x p? n?? h??? ???lyphæthy? k?hxng khu?: 
Pwd m?k bwm dæng h?r??x m? klìn h?em?n h?r? ?x briwe? rxb «briwe? p?h??t?d 
kaur?h?p?h?mi s? ?ng k?? 100.5 Khl???ns??? x?ce? yn p?n wel? n?n k?? 4 ch??wmong h?r??x t??? m?? s??m?rt?h ch?? y? d?? 
s?????? k?hxng k?r h??l we?yn th?? ld lng h?r??x k?r d?xy kh?? k?hxng s?ên pras? ?th p? y?ng s?udk?h?d: K?r pel? ?ynpælng s?? khw?m chùmch? ??n t?h?wr khw?m r?? s??k s?e?yw s?? y?n h?r??x khw?m c?bpwd pheìm k?h??n 
 
 
khu? ca d?? r?b k?r phos?t? Operative Call c?k phy?b?l h??xng ph?kf???n n? w?n rùng k?h??n h?l?ng k?r p?h??t?d phe???x trwc s?xb khu? p?n s? ìng s??kh?? m?k th?? re? ca r?? ?? khu? f???nt?wx? ?ng r? h?l?ngc?k k?r p?h??t?d h??k khu? m? p??h?? h?r??x t?xngk?r ph?d khuy k?b kh?r s??k khn pord thor p? h?? ???lyphæthy? k?hxng khu? x?? rx s??y th??th?k?r p?rs????y? 
 
khu? x?c d?? r?b x?felicia h?r??x cdh?m?y n? cdh?m?y c?k Press Ganey ke? ?yw k?b pras?bk?r?? k?hxng khu? k?b re? n? h??wy p?h??t?d p?h??  p?wy nxk khw?m khidh??n k?hxng khu? kh??x valua Discharge Instructions Attachments/References OXYCODONE/ACETAMINOPHEN (BY MOUTH) (ENGLISH) IBUPROFEN (BY MOUTH) (ENGLISH) ENDOMETRIOSIS (ENGLISH) HYSTERECTOMY: VAGINAL: PRE-OP (ENGLISH) Discharge Orders None Introducing [facility]! [facility] introduces [employer] patient portal. Now you can access parts of your medical record, email your doctor's office, and request medication refills online. 1. In your internet browser, go to [email or web address] 2. Click on the First Time User? Click Here link in the Sign In box. You will see the New Member Sign Up page. 3. Enter your [employer] Access Code exactly as it appears below. You will not need to use this code after youve completed the sign-up process. If you do not sign up before the expiration date, you must request a new code. · [employer] Access Code: [email or web address] Expires: 5/31/2017 11:50 AM 
 
4. Enter the last four digits of your Social Security Number (xxxx) and Date of Birth (mm/dd/yyyy) as indicated and click Submit. You will be taken to the next sign-up page. 5. Create a [employer] ID. This will be your [employer] login ID and cannot be changed, so think of one that is secure and easy to remember. 6. Create a [employer] password. You can change your password at any time. 7. Enter your Password Reset Question and Answer. This can be used at a later time if you forget your password. 8. Enter your e-mail address. You will receive e-mail notification when new information is available in [address] 9. Click Sign Up. You can now view and download portions of your medical record. 10. Click the Download Summary menu link to download a portable copy of your medical information. If you have questions, please visit the Frequently Asked Questions section of the [employer] website. Remember, [employer] is NOT to be used for urgent needs. For medical emergencies, dial 911. Now available from your iPhone and Android! General Information Please provide this summary of care documentation to your next provider. Patient Signature:  ____________________________________________________________ Date:  ____________________________________________________________  
  
William Aceves Provider Signature:  ____________________________________________________________ Date:  ____________________________________________________________ More Information Oxycodone/Acetaminophen (By mouth) Acetaminophen (v-ieou-d-MIN-oh-fen), Oxycodone Hydrochloride (sx-k-LWL-done julio cesar-droe-KLOR-christine) Treats moderate to moderately severe pain. This medicine is a narcotic pain reliever. Brand Name(s):Endocet, Percocet, Primlev, Roxicet, Xartemis XR There may be other brand names for this medicine. When This Medicine Should Not Be Used: This medicine is not right for everyone. Do not use it if you had an allergic reaction to acetaminophen or oxycodone, or if you have serious breathing problems (such as severe asthma, hypercarbia, respiratory depression) or paralytic ileus. How to Use This Medicine:  
Capsule, Liquid, Tablet, Long Acting Tablet · Your doctor will tell you how much medicine to use. Do not use more than directed. · Measure the oral liquid medicine with a marked measuring spoon, oral syringe, or medicine cup. · Swallow the extended-release tablet whole. Do not crush, break, or chew it. Do not lick or wet the tablet before placing it in your mouth. Do not give this medicine through a feeding tube. · This medicine should come with a Medication Guide. Ask your pharmacist for a copy if you do not have one. · Store the medicine in a closed container at room temperature, away from heat, moisture, and direct light. Ask your pharmacist about the best way to dispose of medicine you do not use. Drugs and Foods to Avoid: Ask your doctor or pharmacist before using any other medicine, including over-the-counter medicines, vitamins, and herbal products. · Do not use Xartemis XR if you have used an MAO inhibitor in the past 14 days. · Some medicines and foods can affect how this medicine works. Tell your doctor if you are taking any of the following: ¨ Butorphanol, lamotrigine, nalbuphine, naltrexone, pentazocine, propranolol, zidovudine ¨ Birth control pills, a diuretic (water pill), muscle relaxants, a phenothiazine medicine (chlorpromazine, perphenazine, promethazine, prochlorperazine, thioridazine) · Do not drink alcohol while you are using this medicine. Acetaminophen can damage your liver, and alcohol can increase this risk. Do not take acetaminophen without asking your doctor if you have 3 or more drinks of alcohol every day. · Tell your doctor if you are using any medicine that makes you sleepy, such as allergy medicine or other narcotic pain medicine. Warnings While Using This Medicine: · Tell your doctor if you are pregnant, or if you have kidney disease, liver disease, heart disease, low blood pressure, breathing problems or lung disease, especially if you have asthma, COPD, cor pulmonale, or kyphoscoliosis (curved spine that causes breathing trouble). Tell your doctor if you have urination problems, an underactive thyroid, Ernesto disease, pancreas or prostate problems, or a stomach disorder. Tell your doctor if you have a history of head injury or brain damage, seizures, or alcohol or drug abuse. Tell your doctor if you are allergic to codeine. · Do not breastfeed while you are using this medicine, unless otherwise directed by your doctor. · This medicine may cause the following problems: 
¨ Liver problems ¨ Serious skin reactions ¨ Low blood pressure · If you take this medicine for more than a few weeks, do not stop taking it suddenly.  Your doctor will need to slowly decrease your dose before you stop it completely. · Get emergency help immediately if you think you may have taken too much of this medicine. Signs of an overdose include shallow breathing, fainting, confusion, nausea, vomiting, pinpoint pupils of the eyes, or pale or blue lips, fingernails, or skin. · This medicine may make you dizzy or drowsy. Do not drive or do anything that could be dangerous until you know how this medicine affects you. · This medicine contains acetaminophen. Read the labels of all other medicines you are using to see if they also contain acetaminophen, or ask your doctor or pharmacist. Sandor Key not use more than 4 grams (4,000 milligrams) total of acetaminophen in one day. · This medicine can be habit-forming. Do not use more than your prescribed dose. Call your doctor if you think your medicine is not working. · This medicine may cause constipation, especially with long-term use. Ask your doctor if you should use a laxative to prevent and treat constipation. · Keep all medicine out of the reach of children. Never share your medicine with anyone. Possible Side Effects While Using This Medicine:  
Call your doctor right away if you notice any of these side effects: · Allergic reaction: Itching or hives, swelling in your face or hands, swelling or tingling in your mouth or throat, chest tightness, trouble breathing · Dark urine or pale stools, nausea, vomiting, loss of appetite, stomach pain, yellow skin or eyes · Extreme weakness, shallow breathing, uneven heartbeat, seizures, sweating, or cold or clammy skin · Lightheadedness, dizziness, or fainting · Trouble breathing If you notice these less serious side effects, talk with your doctor: · Constipation · Headache · Mild lightheadedness, sleepiness, or drowsiness · Mild nausea or vomiting If you notice other side effects that you think are caused by this medicine, tell your doctor. Call your doctor for medical advice about side effects.  You may report side effects to FDA at 7-961-SXF-7416 © 2016 [address] is for End User's use only and may not be sold, redistributed or otherwise used for commercial purposes. The above information is an  only. It is not intended as medical advice for individual conditions or treatments. Talk to your doctor, nurse or pharmacist before following any medical regimen to see if it is safe and effective for you. Ibuprofen (By mouth) Ibuprofen (eye-bue-PROE-fen) Treats pain and fever. This medicine is an NSAID. Brand Name(s): Advil, Advil Children's, Advil Liqui-Gels, Advil Migraine, All-Purpose First Aid Kit, Children's Ibuprofen, Children's Motrin, Comfort Pac, Concentrated Motrin Infants' Drops, Genpril, [facility]ighbor Cap-Profen, [facility]ighbor Ibuprofen Infants', [facility] Children's Ibuprofen, [facility] Ibuprofen, [facility] Ibuprofen Reji Strength There may be other brand names for this medicine. When This Medicine Should Not Be Used: This medicine is not right for everyone. Do not use if you had an allergic reaction (including asthma) to ibuprofen, aspirin, or another NSAID, or right before or after heart surgery. How to Use This Medicine:  
Capsule, Liquid Filled Capsule, Suspension, Tablet, Chewable Tablet · Your doctor will tell you how much medicine to use. Do not use more than directed. · Prescription ibuprofen should come with a Medication Guide. Ask your pharmacist for the Medication Guide if you do not have one. · Follow the instructions on the medicine label if you are using this medicine without a prescription. · Take this medicine with food or milk if it upsets your stomach. · Oral liquid: Shake well just before using. Measure with a marked measuring spoon, oral syringe, or medicine cup. · Chewable tablet: Chew completely before you swallow it.  Then drink some water to make sure you swallow all of the medicine. · For Children: Ask your pharmacist if you are not sure how much medicine to give a child. The dose is usually based on weight, not age. Never give more medicine than directed. · For Adults: Do not take more than 6 pills in 1 day (24 hours) unless your doctor tells you to. · Missed dose: If you take this medicine on a regular basis and miss a dose, take it as soon as you can. If it is almost time for your next dose, wait until then to use the medicine and skip the missed dose. Do not use extra medicine to make up for a missed dose. · Store the medicine in a closed container at room temperature, away from heat, moisture, and direct light. Do not freeze the oral liquid. Drugs and Foods to Avoid: Ask your doctor or pharmacist before using any other medicine, including over-the-counter medicines, vitamins, and herbal products. · Some foods and medicine can affect how ibuprofen works. Tell your doctor if you are also using lithium, methotrexate, a blood thinner (such as warfarin), a steroid medicine (such as hydrocortisone, prednisolone, prednisone), a diuretic (water pill), or an ACE inhibitor blood pressure medicine. · Do not use any other NSAID medicine unless your doctor says it is okay. Some other NSAIDs are aspirin, diclofenac, naproxen, or celecoxib. · Do not drink alcohol while you are using this medicine. Warnings While Using This Medicine: · Tell your doctor if you are pregnant or breastfeeding. Do not use this medicine during the later part of pregnancy. · Tell your doctor if you have kidney disease, liver disease, asthma, lupus or a similar connective tissue disease, or a history of ulcers or other digestion problems. Tell your doctor if you smoke or have heart or blood circulation problems, including high blood pressure, heart failure (CHF), or bleeding problems. · This medicine may cause the following problems: ¨ Bleeding and ulcers in the stomach or intestines ¨ Higher risk of heart attack or stroke ¨ Liver damage ¨ Kidney damage ¨ Vision problems · Call your doctor if symptoms get worse, pain lasts more than 10 days, or fever lasts more than 3 days. · This medicine might contain sugar or phenylalanine (aspartame). · Tell any doctor or dentist who treats you that you are using this medicine. · Keep all medicine out of the reach of children. Never share your medicine with anyone. Possible Side Effects While Using This Medicine:  
Call your doctor right away if you notice any of these side effects: · Allergic reaction: Itching or hives, swelling in your face or hands, swelling or tingling in your mouth or throat, chest tightness, trouble breathing · Blistering, peeling, or red skin rash · Change in how much or how often you urinate · Chest pain that may spread to your arms, jaw, back, or neck, trouble breathing, nausea, unusual sweating, faintness · Chest pain, trouble breathing, weakness on one side of your body, severe headache, trouble seeing or talking, pain in your lower leg · Dark urine or pale stools, nausea, vomiting, loss of appetite, stomach pain, yellow skin or eyes · Fever, neck pain, stiff neck · Severe stomach pain, vomiting blood, bloody or black, tarry stools · Swelling in your hands, ankles, or feet, rapid weight gain · Trouble seeing, blind spots, change in how you see colors · Unusual bleeding, bruising, or weakness If you notice these less serious side effects, talk with your doctor: · Constipation, diarrhea, gas, mild upset stomach · Dizziness, headache, ringing in the ears If you notice other side effects that you think are caused by this medicine, tell your doctor. Call your doctor for medical advice about side effects. You may report side effects to FDA at 0-253-FDA-2231 © 2016 [address] is for End User's use only and may not be sold, redistributed or otherwise used for commercial purposes. The above information is an  only. It is not intended as medical advice for individual conditions or treatments. Talk to your doctor, nurse or pharmacist before following any medical regimen to see if it is safe and effective for you. Endometriosis: Care Instructions Your Care Instructions Cells that are like the cells that line the inside your womb (uterus) sometimes grow on the outside of the uterus. This is called endometriosis. These clumps of cells can cause pain and problems with your periods. They can become inflamed and may bleed. Scar tissue that forms over time can make it difficult to get pregnant. Medicines and sometimes surgery can relieve pain and help women who want to get pregnant. Follow-up care is a key part of your treatment and safety. Be sure to make and go to all appointments, and call your doctor if you are having problems. Its also a good idea to know your test results and keep a list of the medicines you take. How can you care for yourself at home? · Take your medicines exactly as prescribed. Call your doctor if you think you are having a problem with your medicine. · Take pain medicines exactly as directed. ¨ If the doctor gave you a prescription medicine for pain, take it as prescribed. ¨ If you are not taking a prescription pain medicine, ask your doctor if you can take an over-the-counter medicine. · Apply heat, such as a hot water bottle or a heating pad set on low, to your lower belly. Or take a warm bath. Heat may relieve pain. · Lie down and put a pillow under your knees to raise your legs. This will relieve pressure on your back. When should you call for help? Call 911 anytime you think you may need emergency care. For example, call if: 
· You passed out (lost consciousness). Call your doctor now or seek immediate medical care if: · You have sudden, severe pain in your belly or pelvis. Watch closely for changes in your health, and be sure to contact your doctor if: 
· You have new belly or pelvic pain. · You think you may be pregnant. · You do not get better as expected. Where can you learn more? Go to http://harry-carlos.info/. Enter N557 in the search box to learn more about \"Endometriosis: Care Instructions. \" Current as of: February 25, 2016 Content Version: 11.1 © 9028-2421 [employer]. Care instructions adapted under license by [employer] (which disclaims liability or warranty for this information). If you have questions about a medical condition or this instruction, always ask your healthcare professional. [address] any warranty or liability for your use of this information. Vaginal Hysterectomy: Before Your Surgery What is a vaginal hysterectomy? Vaginal hysterectomy is surgery to remove the uterus. It is done through a cut (incision) in the vagina. The cervix is often taken out at the same time. The doctor may also take out your ovaries and fallopian tubes. After the surgery, you will no longer have periods. You also will not be able to get pregnant. Your doctor may advise you to take hormone pills if your ovaries were removed. Your doctor will talk to you about the risks and benefits of hormones. He or she will also tell you how long to take them. This surgery probably won't lower your interest in sex. In fact, some women enjoy sex more. This may be because they no longer have to worry about birth control or heavy bleeding. Some women go home the day of surgery and some will stay in the hospital 1 to 2 days after surgery. You may feel better each day. But it may take 4 to 6 weeks to fully recover. Follow-up care is a key part of your treatment and safety.  Be sure to make and go to all appointments, and call your doctor if you are having problems. It's also a good idea to know your test results and keep a list of the medicines you take. What happens before surgery? Surgery can be stressful. This information will help you understand what you can expect. And it will help you safely prepare for surgery. Preparing for surgery · Understand exactly what surgery is planned, along with the risks, benefits, and other options. · Tell your doctors ALL the medicines, vitamins, supplements, and herbal remedies you take. Some of these can increase the risk of bleeding or interact with anesthesia. · If you take blood thinners, such as warfarin (Coumadin), clopidogrel (Plavix), or aspirin, be sure to talk to your doctor. He or she will tell you if you should stop taking these medicines before your surgery. Make sure that you understand exactly what your doctor wants you to do. · Your doctor will tell you which medicines to take or stop before your surgery. You may need to stop taking certain medicines a week or more before surgery. So talk to your doctor as soon as you can. · If you have an advance directive, let your doctor know. It may include a living will and a durable power of  for health care. Bring a copy to the hospital. If you don't have one, you may want to prepare one. It lets your doctor and loved ones know your health care wishes. Doctors advise that everyone prepare these papers before any type of surgery or procedure. What happens on the day of surgery? · Follow the instructions exactly about when to stop eating and drinking. If you don't, your surgery may be canceled. If your doctor has told you to take your medicines on the day of surgery, take them with only a sip of water. · Take a bath or shower before you come in for your surgery. Do not apply lotions, perfumes, deodorants, or nail polish. · Do not shave the surgical site yourself. · Take off all jewelry and piercings. And take out contact lenses, if you wear them. At the hospital or surgery center · Bring a picture ID. · The area for surgery is often marked to make sure there are no errors. · You will be kept comfortable and safe by your anesthesia provider. The anesthesia may make you sleep. Or it may just numb the area being worked on. · The surgery usually takes about 1 to 2 hours. Going home · Be sure you have someone to drive you home. Anesthesia and pain medicine make it unsafe for you to drive. · You will be given more specific instructions about recovering from your surgery. They will cover things like diet, wound care, follow-up care, driving, and getting back to your normal routine. When should you call your doctor? · You have questions or concerns. · You don't understand how to prepare for your surgery. · You become ill before the surgery (such as fever, flu, or a cold). · You need to reschedule or have changed your mind about having the surgery. Where can you learn more? Go to http://harry-carlos.info/. Enter A511 in the search box to learn more about \"Vaginal Hysterectomy: Before Your Surgery. \" Current as of: February 25, 2016 Content Version: 11.1 © 5874-1528 [employer]. Care instructions adapted under license by [employer] (which disclaims liability or warranty for this information). If you have questions about a medical condition or this instruction, always ask your healthcare professional. [place] [address] any warranty or liability for your use of this information.

## 2017-03-13 NOTE — ANESTHESIA PREPROCEDURE EVALUATION
Anesthetic History   No history of anesthetic complications            Review of Systems / Medical History  Patient summary reviewed, nursing notes reviewed and pertinent labs reviewed    Pulmonary  Within defined limits                 Neuro/Psych   Within defined limits           Cardiovascular  Within defined limits                Exercise tolerance: >4 METS     GI/Hepatic/Renal  Within defined limits              Endo/Other      Hypothyroidism       Other Findings   Comments: Pt denies any blood diseases of any kind         Physical Exam    Airway  Mallampati: I  TM Distance: 4 - 6 cm  Neck ROM: normal range of motion   Mouth opening: Normal     Cardiovascular  Regular rate and rhythm,  S1 and S2 normal,  no murmur, click, rub, or gallop             Dental  No notable dental hx       Pulmonary  Breath sounds clear to auscultation               Abdominal  GI exam deferred       Other Findings            Anesthetic Plan    ASA: 2  Anesthesia type: general            Anesthetic plan and risks discussed with: Patient      Phone  used for all questions and examination.     UPT - this am.

## 2017-03-13 NOTE — PERIOP NOTES
Tabitha Ochoa   1970  [id number]    Situation:  Verbal report given from: TEDDY Sparks CRNA  Procedure: Procedure(s):  DIAGNOSTIC LAPAROSCOPY    Background:    Preoperative diagnosis: ABDOMINAL PAIN LEFT LOWER QUADRANT AND ELEVATED     Postoperative diagnosis: ENDOMETRIOSIS     :  Dr. Luli Galicia    Assistant(s): Circ-1: Elizabeth Webb RN  Scrub Tech-1: Aliya Ham    Specimens: * No specimens in log *    Assessment:  Intra-procedure medications         Anesthesia gave intra-procedure sedation and medications, see anesthesia flow sheet     Intravenous fluids: Stacey Harp     Vital signs stable       Recommendation:    Permission to share finding with family or friend yes

## 2017-03-13 NOTE — DISCHARGE INSTRUCTIONS
After Care Instructions For Your Laparoscopy      1. You may resume your usual diet once the nausea resolves. Initially, try sips of warm fluids and a bland diet. 2. Avoid heavy lifting or straining. Gradually increase your activity. First try walking and doing light activity around the house. You may resume your normal habits if no significant discomfort or bleeding develops. Most women can return to work within 2-7 days after this procedure. 3. Sexual intercourse can be resumed as soon as desired provided the discomfort following surgery has subsided. 4. You may take showers or tub baths. It is also safe to swim or use hot tubs once you feel up to it. 5. Some lower abdominal cramping typically occurs after this procedure. Tylenol, Motrin or your prescribed pain medication should relieve this discomfort. You should notice steady improvement in the pain over the next day or so. It is also not unusual to experience some discomfort under your ribs or to notice neck or shoulder soreness. This is due to gas used during the surgery to help the physicians see your pelvic organs. The gas is reabsorbed over a 24 hour course. 6. It is not unusual to have vaginal spotting lasting 2-3 days. It is difficult to predict when your next menstrual period will occur as your body has undergone a major stress. Your period should regulate itself within the first 6 weeks post-op. 7. A bruise may appear around the incision and will gradually resolve as it heals. 8. The suture used to close the incision may be visible above the skin. If so, it will be removed at your office visit. However, frequently sutures are placed below the skin and will reabsorb on their own.   There will be no need for removal.     9. Call the office at [phone] to report any of the following problems: Abdominal pain that is increasing in severity, heavy vaginal bleeding, drainage or separation of your incision site, temperature greater than 100.4 or persistent nausea and vomiting. 10. You should be seen in the office following your surgery. Call for an appointment if this has not already been arranged. At this appointment, the findings noted at the time of your procedure will be discussed. 11. You may remove the dressing from your incision after 12 hours. You received Toradol during your surgery. You may not take any form of NSAIDS (non steroidal anti inflammatory drugs) such as Advil, Ibuprofen, Aleve, Motrin until 2:30pm.      DO NOT TAKE TYLENOL/ACETAMINOPHEN WITH PERCOCET  TAKE NARCOTIC PAIN MEDICATIONS WITH FOOD   Narcotics tend to be constipating, we suggest taking a stool softener such as Colace or Miralax (follow package instructions). DO NOT DRIVE WHILE TAKING NARCOTIC PAIN MEDICATIONS. DO NOT TAKE SLEEPING MEDICATIONS OR ANTIANXIETY MEDICATIONS WHILE TAKING NARCOTIC PAIN MEDICATIONS,  ESPECIALLY THE NIGHT OF ANESTHESIA. CPAP PATIENTS BE SURE TO WEAR MACHINE WHENEVER NAPPING OR SLEEPING. DISCHARGE SUMMARY from Nurse    The following personal items collected during your admission are returned to you:   Dental Appliance: Dental Appliances: None  Vision: Visual Aid: Glasses  Hearing Aid:    Jewelry: Jewelry: None  Clothing: Clothing: Other (comment) (belonging bag)  Other Valuables: Other Valuables: Purse (given to )  Valuables sent to safe:        PATIENT INSTRUCTIONS:    After General Anesthesia or Intravenous Sedation, for 24 hours or while taking prescription Narcotics:        Someone should be with you for the next 24 hours. For your own safety, a responsible adult must drive you home. · Limit your activities  · Recommended activity: Rest today, up with assistance today. Do not climb stairs or shower unattended for the next 24 hours. · Please start with a soft bland diet and advance as tolerated (no nausea) to regular diet.   · If you have a sore throat you should try the following: fluids, warm salt water gargles, or throat lozenges. If it does not improve after several days please follow up with your primary physician. · Do not drive and operate hazardous machinery  · Do not make important personal or business decisions  · Do  not drink alcoholic beverages  · If you have not urinated within 8 hours after discharge, please contact your surgeon on call. Report the following to your surgeon:  · Excessive pain, swelling, redness or odor of or around the surgical area  · Temperature over 100.5  · Nausea and vomiting lasting longer than 4 hours or if unable to take medications  · Any signs of decreased circulation or nerve impairment to extremity: change in color, persistent  numbness, tingling, coldness or increase pain      · You will receive a Post Operative Call from one of the Recovery Room Nurses on the day after your surgery to check on you. It is very important for us to know how you are recovering after your surgery. If you have an issue or need to speak with someone, please call your surgeon, do not wait for the post operative call. · You may receive an e-mail or letter in the mail from [facility] regarding your experience with us in the Ambulatory Surgery Unit. Your feedback is valuable to us and we appreciate your participation in the survey. · If the above instructions are not adequate, please contact Yudi Crump RN, [facility] anesthesia Nurse Manager or our Anesthesiologist, at [phone]. If you are having problems after your surgery, call the physician at his office number. · We wish you a speedy recovery ? What to do at Home:      *  Please give a list of your current medications to your Primary Care Provider. *  Please update this list whenever your medications are discontinued, doses are      changed, or new medications (including over-the-counter products) are added.     *  Please carry medication information at all times in case of emergency situations. These are general instructions for a healthy lifestyle:    No smoking/ No tobacco products/ Avoid exposure to second hand smoke    Surgeon General's Warning:  Quitting smoking now greatly reduces serious risk to your health. Obesity, smoking, and sedentary lifestyle greatly increases your risk for illness    A healthy diet, regular physical exercise & weight monitoring are important for maintaining a healthy lifestyle    You may be retaining fluid if you have a history of heart failure or if you experience any of the following symptoms:  Weight gain of 3 pounds or more overnight or 5 pounds in a week, increased swelling in our hands or feet or shortness of breath while lying flat in bed. Please call your doctor as soon as you notice any of these symptoms; do not wait until your next office visit. Recognize signs and symptoms of STROKE:    F-face looks uneven    A-arms unable to move or move even    S-speech slurred or non-existent    T-time-call 911 as soon as signs and symptoms begin-DO NOT go       Back to bed or wait to see if you get better-TIME IS BRAIN. If you have not received your influenza and/or pneumococcal vaccine, please follow up with your primary care physician. The discharge information has been reviewed with the patient and spouse. The patient and spouse verbalized understanding.       ???????????????????????????????????????????????????      ??????????????????????????????????????????????????????????? ?????????????????????????????????????    ??????????????????????????? ??????????????????????? ????????????????????????? ? ???????? ?????????????????????????????????????????????????????????????????????????????????????????????? ????????????????????????????????????? 2-7 ????????????????????    ????????????????????????????????????????????????????????????????????????????????????????????????    ????????????????????????????????? ??????????????????????????????????????????????????????????????????????????????    ????????????????????????????????????????????????????????? Tylenol, Motrin ??????????????????????????????????????????? ?????????????????????????????????????????????????????? ????????????????????????????????????????????????????????????????????????????????? ??????????????????????????????????????????????????????????????????????????????????????? ??????????????????????? 24 ???????    ?????????????????????????????????????????????????????????? 2-3 ??? ??????????????????????????????????????????????????????????????????????????????????????????????????????????????????? ?????????????????????????????????? 6 ??????????????????????????    ????????????????????? ? ?????????????????????????????????????    ??????????????????????????????????????????????? ?????????????????????????????????????????????????? ??????????????????????????????????????????????????????????????????? ???????????????????????    ???????????????? [phone] ?????????????????? ? ????????: ???????????????????????????????????, ????????????????????????, ??????????????????????????????????, ??????????????? 100.4 ????????????????????????????????????    ????????????????????????????????????????? ??????????????????????????? ??????????????????????????????????????????????????????????????????????????????    ???????????????????????????????????????????? 12 ???????    ????????? Toradol ?????????????????? ???????????? NSAIDS (Non steroidal inflammatory drugs) ? ??? Advil, Ibuprofen, Aleve, Motrin ????????? 14.30 ?.      ??????? TYLENOL / ACETAMINOHEN ? ?? PERCOCET  ?????????????????????????????????????????????????  ??????????????????????????????????????????????????????????????????????? Colace ? ??? Miralax (????????????????????)    ??????????????????????????????????????????????????????????????????    ??????????????????????????????????????????????????????????????????????????????????????????????????????????????????????????????????????????????????????    ??????? CPAP ?????????????????????????????????????????????????    ??????????????????????    ????????????????????????????????????????????????????????????????????????????????:  ???????????????: ??????????????????: ?????  ??????????: Visual Aid: Glasses  ??????????????:  ?????????????: ?????????????: ?????  ????????: ????????: ???? ? (???????????????) (belong bag)  ? ?????? ? : ???????????? ? : ??????????? (???????)  ??????????????????????:      ????????????????????:    ???????????????????????????????????????????????????????????????????????? 24 ????????????????????????????????????????????:  ????????????????????????????? 24 ???????  ???????????????????????????????????????????????????????????????????  ????? ?????????????  ???????????????: ?????????????????????????????????? ????????????????????????????????????????? 24 ????????????  ???????????????????????????????????????????????????????????????????????? (??????????????????) ?????????????????  ????????????????????????????????????????: ?????????????????????????? ? ????????? ??????????????????????????????????????????????????????????????  ????????????????????????????????????????  ??????????????????????????????????????????  ????????????????????????????  ??????????????????????????? 8 ?????????????????????????????????????????????    ????????????????????????????????:  ????????????????????????????????????????? ? ????????????  ??????????????? 100.5  ?????????????????????????????? 4 ???????????????????????????????  ?????????????????????????????????????????????????????????????????: ??????????????????????????????????????????????????????????????????????????????      ???????????????????  Operative Call ????????????????????????????????????????????????????????????? ??????????????????????????????????????????????????????????????? ????????????????????????????????????????????????????????????????????????????????????????????    ?????????????????????????????????????? Press Ganey ?????????????????????????????????????????????????????? ???????????????????? valua     Suggest an edit     [facility]? næan? n? k?r d?læ h?l?ng p?h??t?d d?wy kl?xngs? ?xngth?ngk?l k?hxng khu?      khu? x?c kl?b m? r?bprath?n x?h??r t?m pkti d?? me???x x?k?r khl???ns??? h??y p? reìm t?n lxng d???m n?? xùn læa x?h??r x?xnyon    h?l?k le??yng k?r yk h?r??x r?ng h?n?k kh?xy«pheìm kickrrm k?hxng khu? lxng dein læa th? kickrrm be? rxb «b? ?n k?xn khu? x?c reìm t?n nis? ?y pkti k?hxng khu? d?? h??k m??m? khw?m r?? s??k m?? s?b?y h?r??x m? le??xd h??l keid k?h??n x??ng m? n?y s??kh?? p?h??h??ing s??wn h???? s??m?rt?h kl?b p? th?ng?n p?h?yn? 2-7 w?n h?l?ngc?k k?h?n txn n??    k?r m? phe?? s? ? mph?n?h? ca d?nein t?x p? d?? doy r?w th??s?ud thè? th?? t?xngk?r h??k khw?m r?? s??k m?? s?b?y h?l?ng k?r p?h??t?d ld lng    khu? x?c ch?? wel? x?b n?? h?r??x x??ngx?bn?? nxkc?k n?? y?ng plxdp?h?y th?? ca ??y n?? h?r??x ch?? x??ng n?? r?xn me???x khu? r??s??k t?h?ng s? ìng h?el?? n??    k?r ld takhriw n? ch?xng t?xng s??wn l??ng m?k keid k?h??n h?l?ngc?k k?h?n txn n?? Tylenol, Motrin h?r??x y? kæ? pwd th?? khu? k?h?nd khwr ld x?k?r m?? s?b?y n?? khu? khwr s??ngket h??n khw?m kh??b h?n?? n? k?r pwd me? ??xy n? w?n rùng k?h??n nxkc?k n?? y?ng m?? keid x?k?r m?? s?b?y t?m s?? khorng k?hxng khu? h?r??x s??ngket khw?m r?? s??k c?b khx h?r??x h??l? n?? p?n pher?a ??s th?? ch?? n? rah? ??ng k?r p?h??t?d phe???x ch?wy h? ?? phæthy? h??n xw?ywa n? xûng cheingkr?n k?hxng khu? ??s s??m?rt?h d?d s?m d?? tlxd 24 ch??wmong    m?n m?? d?? p?n re? ??xng p?hid pkti th?? m? ch?xng khlxd cud c?næk p?n wel? n?n 2-3 w?n p?nk?r y?k th?? ca kh?d de? d?? me???x ch?wng wel? m? prac?de? ?xn khr?ng t?h?d p? ca keid k?h??n ne???xngc?k r??ngk?y k?hxng khu? d?? r?b khw?mkher?yd th?? s??kh?? gonzalo wel? k?hxng khu? khwr khwbkhum t?w xeng p?h?yn? 6 s??pd?h?? ræk h?l?ng k?r d?nein k?r    rxy c?? x?c pr?kt? k?h??n rxb «rxy b?k læa ca kh?xy«kæ? p??h?? t?m th?? ye?ywy? rxy y?b th?? ch?? n? k?r pid rxy b?k x?c pr?kt? h?en? ?x p?hiwh?n?ng h??k p?n chèn n?n ca t?h?k n? xxk n? k?r k?hê? chm s??n?kng?n k?hxng khu? x??ngr?k?t?m rxy t?x th?? th? b?xy«ca xy?? t?? p?hiwh?n?ng læa ca d?d s?m kl?bkh??n m? xeng m?? c?p?n t?xng m? k?r k?c?d    thor h?? th?? s??n?kng?n [phone] phe???x r?yng?n p??h?? d? «t?x p? n??: X?k?r pwd t?xngth? ? pheìm k?h??n x??ng runræng, m? le??xd xxk th?ng ch?xng khlxd m?k, k?r rab?y n?? h?r??x k?r yæk ph???nth?? k?hxng p?hæl, kaur?h?p?h?mi s? ?ng k?? 100.4 H?r??x x?k?r khl???ns??? x?ce? yn læa b?xy khr?ng    khu? khwr ca d?? h??n n? xxffi?? t?m k?r p?h??t?d k?hxng khu? thor n?d h??k y?ng m?? d?? c?d ter?ym n? k?r n?dh?m?y n?? ca m? k?r kl??w t?h?ng p?hl k?r wic?y th?? rabu w?? n? ch?wng wel? k?hxng krabwnk?r k?hxng khu?    khu? x?c t?hxd n?? s?l?d xxk c?k p?hæl k?hxng khu? h?l?ngc?k p?h??n p? 12 ch??wmong    khu? d?? r?b Toradol n? rah? ??ng k?r p?h??t?d khu? m?? khwr ch?? NSAIDS (Non steroidal inflammatory drugs) chèn Advil, Ibuprofen, Aleve, Motrin cnt?h?ng wel? 14.30 N. X?? ch?? TYLENOL/ ACETAMINOHEN k?b PERCOCET  ch?? y? r?ks? ?? rokh mar?ng n? kraphe?a x?h??r th?? m??m? prayochn?  y? s?eph tid m? næwnôm th?? ca p?n x?k?r t?xngp?h?k re? k?hx næan? h??? ch?? n?? y? pr?b s? t?n chèn Colace h?r??x Miralax (th? t?m kh? næan? n? phækh ke)    h???m r?bprath?n k?h?a th?? ch?? y? r?ks? ?? rokh mar?ng th?? ke??yw k?b rokh mar?ng p?hiwh?n?ng    h???m ch?? y? r?ks? ?? rokh nxn h?l?b h?r??x y? r?ks? ?? rokh h?xbh???d n? k?h?a th??th?k?r r?ks??? d?wy y? r?ks? ?? rokh mar?ng th?? ke??yw k?b rokh mar?ng p?hiwh?n?ng doy c?heph?a x??ng yìng y?m kh??kh??n x?n ?? s?m??s?emx    p?h?? p?wy CPAP khwr ch?? kher? ??xng me???x d? k?t?m th?? k?r phe?a h?r??x k?r nxn h?l?b    s?rup s? ?ra s? ?kh?? c?k phy?b?l    r?yk?r s??wn bukhkhl t?x p? n?? th?? rwbrwm rah? ??ng k?r r?b k?hê? re?yn k?hxng khu? ca t?h?k s??ng kl?b m?y?ng khu?:  Tianna Snuffer?? th?nt krrm: Nunn Gemma? ??xng ch?? th?nt krrm: M??m?  wis? ?y th???n?: Visual Aid: Glasses  kher? ??xng ch?wy f?ng:  Nunn Gemma? ??xngprad? b: Nunn Gemma? ??xngprad?b: M??m?  s?e? ??xp???: S?e???xp???: X???n « (s?ædng khw?m khidh??n) (belong bag)  kh?? x???n « : K?hxng m? kh?? x???n « : Krap?? ngein (h??? s??m?)  k? hxng m? kh?? s??ng p? y?ng t?? sef:      Kh? næan? s??h?r?b p?h?? p?wy:    H?l?ngc?k k?r rang?b khw?m r?? s??k th??wp? h?r??x k?r rang?b khw?m r?? s??k h?lxdle? ?xd d? p?n wel? 24 ch??wmong h?r??x n? k?h?a th?? ch?? y? s?eph tidt?m b?s???ng phæthy?:  Kh?r b?ng khn khwr xy?? k?b khu? t?x p? x?k 24 ch??wmong  phe???x khw?m plxdp?h?y k?hxng khu? xeng p?h??h???? th?? m? khw?m r?bp?hidchxb t?xng k?h?b rt?h kl?b b??n  c?k?d kickrrm k?hxng khu?  kickrrm th?? næan?: Ph?k w?n n?? d?wy khw?m ch?wyh?el? ?x n? w?n n?? x?? p?n k?h? ?n b?nd? h?r??x x?b n?? doy m?? t?xng s??? n? 24 ch??wmong t?h?d p?  pord reìm t?n d?wy x?h??r th?? nùmnwl x?xn nùm læa m? khw?m k??wh?n?? p?n th?? yxmr?b d?? (m??m? x?k?r khl???ns???) t?x x?h??r p?n prac? h??k khu? m? x?k?r c?b khx khu? khwr lxng s? ìng t?x p? n??: K?hxngh? elw h?r??x n??s??ms? ?ych? xùn «h?r??x khxh?xy h??k m?? d? k?h??n h?l?ngc?k p?h??n p? h?l?y w?n pord tidt?m p?hl k?b phæthy? h?l?k k?hxng khu?  h???m k?h?b rt?h læa ch?? kher? ??xngc?kr th?? p?n x?ntr?y  x?? th?k?r t?ds?inc? s? ? wnt?w h?r??x ?hurkic th?? s??kh??  x?? d???m kher? ??xng d???m xælkx?xl?  h??k khu? y?ng m?? d?? p?s?s? ?wa p?h?yn? 8 ch??wmong h?l?ngkh lxd pord tidt?x ? ? ?lyphæthy? th?ng thor? ??phth? r?yng?n t?x p? n?? h??? ???lyphæthy? k?hxng khu?:  Pwd m?k bwm dæng h?r??x m? klìn h?em?n h?r? ?x briwe? rxb «briwe? p?h??t?d  kaur?h?p?h?mi s? ?ng k?? 100.5  Khl???ns??? x?ce? yn p?n wel? n?n k?? 4 ch??wmong h?r??x t??? m?? s??m?rt?h ch?? y? d??  s?????? k?hxng k?r h??l we?yn th?? ld lng h?r??x k?r d?xy kh?? k?hxng s?ên pras? ?th p? y?ng s?udk?h?d: K?r pel? ?ynpælng s?? khw?m chùmch? ??n t?h?wr khw?m r?? s??k s?e?yw s?? y?n h?r??x khw?m c?bpwd pheìm k?h??n      khu? ca d?? r?b k?r phos?t? Operative Call c?k phy?b?l h??xng ph?kf???n n? w?n rùng k?h??n h?l?ng k?r p?h??t?d phe???x trwc s?xb khu? p?n s? ìng s??kh?? m?k th?? re? ca r?? ?? khu? f???nt?wx? ?ng r? h?l?ngc?k k?r p?h??t?d h??k khu? m? p??h?? h?r??x t?xngk?r ph?d khuy k?b kh?r s??k khn pord thor p? h?? ???lyphæthy? k?hxng khu? x?? rx s??y th??th?k?r p?rs????y?    khu? x?c d?? r?b x?felicia h?r??x cdh?m?y n? cdh?m?y c?k Press Ganey ke? ?yw k?b pras?bk?r?? k?hxng khu? k?b re? n? h??wy p?h??t?d p?h??  p?wy nxk khw?m khidh??n k?hxng khu? kh??x valua

## 2017-03-13 NOTE — BRIEF OP NOTE
BRIEF OPERATIVE NOTE    Date of Procedure: 3/13/2017   Preoperative Diagnosis: ABDOMINAL PAIN LEFT LOWER QUADRANT AND ELEVATED   Postoperative Diagnosis: Same with evidence of endometriosis   Procedure(s):  DIAGNOSTIC LAPAROSCOPY  Surgeon(s) and Role:     * Kee Hyman MD - Primary     * Margot Deluca MD - Assisting            Surgical Staff:  Circ-1: Dane Wright RN  Scrub Tech-1: Ridge Lassiter  Event Time In   Incision Start 9707   Incision Close 0908     Anesthesia: General   Estimated Blood Loss: Minimal  Specimens: None  Findings: Normal size uterus, hemorrhagic implants along bladder flap seen, Omental  and bowel adhesions to left adnexal region precluding visualization of left adnexal, Portion of right tube seen which was dilated c/w prior salpingitis, Right ovary not seen. Normal liver edge. Photos obtained.     Complications: None+  Implants: * No implants in log *   Dictated:[id number]

## 2017-03-13 NOTE — IP AVS SNAPSHOT
Summary of Care Report The Summary of Care report has been created to help improve care coordination. Users with access to "[employer]" or [address] (Web-based application) may access additional patient information including the Discharge Summary. If you are not currently a [address] user and need more information, please call the number listed below in the [place] section and ask to be connected with Medical Records. Facility Information Name Address Phone [address] [address] [phone] Patient Information Patient Name Sex MIRIAN Ritchie ([id number]) Female 1970 Discharge Information Admitting Provider Service Area Unit Surinder Xiao MD / [address] [facility] [place] / [phone] Discharge Provider Discharge Date/Time Discharge Disposition Destination (none) 3/13/2017 (Pending) AHR (none) Patient Language Language KAILASH [67] Problem List as of 3/13/2017  Date Reviewed: 3/13/2017 Codes Priority Class Noted - Resolved * (Principal)Pelvic pain in female ICD-10-CM: R10.2 ICD-9-CM: 625.9   3/11/2017 - Present Leiomyoma of body of uterus ICD-10-CM: D25.9 ICD-9-CM: 218.9   3/11/2017 - Present Elevated CA-125 ICD-10-CM: R97.1 ICD-9-CM: 795.82   3/11/2017 - Present You are allergic to the following No active allergies Current Discharge Medication List  
  
START taking these medications Dose & Instructions Dispensing Information Comments  
 ibuprofen 400 mg tablet Commonly known as:  MOTRIN Dose:  400 mg Take 1 Tab by mouth every six (6) hours as needed for Pain. Quantity:  30 Tab Refills:  1  
   
 oxyCODONE-acetaminophen 5-325 mg per tablet Commonly known as:  PERCOCET Dose:  1-2 Tab Take 1-2 Tabs by mouth every four (4) hours as needed for Pain. Max Daily Amount: 12 Tabs. Quantity:  15 Tab Refills:  0 CONTINUE these medications which have NOT CHANGED Dose & Instructions Dispensing Information Comments  
 methIMAzole 5 mg tablet Commonly known as:  TAPAZOLE Dose:  5 mg Take 5 mg by mouth two (2) times a day. Indications: hyperthyroidism Refills:  0  
   
 TYLENOL 325 mg tablet Generic drug:  acetaminophen Dose:  325 mg Take 325 mg by mouth every four (4) hours as needed for Pain. Indications: Pain Refills:  0 ZyrTEC 10 mg tablet Generic drug:  cetirizine Dose:  10 mg Take 10 mg by mouth daily. Indications: ALLERGIC RHINITIS Refills:  0 Surgery Information ID Date/Time Status Primary Surgeon All Procedures Location [id number] 3/13/2017 0815 Unposted Cornell Tiwari MD DIAGNOSTIC LAPAROSCOPY [facility] Follow-up Information Follow up With Details Comments Contact Info None   None (395) Patient stated that they have no PCP 
  
 TEREZA Cuellar MD Follow up in 1 week(s) Discharge Instructions After Care Instructions For Your Laparoscopy 1. You may resume your usual diet once the nausea resolves. Initially, try sips of warm fluids and a bland diet. 2. Avoid heavy lifting or straining. Gradually increase your activity. First try walking and doing light activity around the house. You may resume your normal habits if no significant discomfort or bleeding develops. Most women can return to work within 2-7 days after this procedure. 3. Sexual intercourse can be resumed as soon as desired provided the discomfort following surgery has subsided. 4. You may take showers or tub baths. It is also safe to swim or use hot tubs once you feel up to it. 5. Some lower abdominal cramping typically occurs after this procedure. Tylenol, Motrin or your prescribed pain medication should relieve this discomfort. You should notice steady improvement in the pain over the next day or so. It is also not unusual to experience some discomfort under your ribs or to notice neck or shoulder soreness. This is due to gas used during the surgery to help the physicians see your pelvic organs. The gas is reabsorbed over a 24 hour course. 6. It is not unusual to have vaginal spotting lasting 2-3 days. It is difficult to predict when your next menstrual period will occur as your body has undergone a major stress. Your period should regulate itself within the first 6 weeks post-op. 7. A bruise may appear around the incision and will gradually resolve as it heals. 8. The suture used to close the incision may be visible above the skin. If so, it will be removed at your office visit. However, frequently sutures are placed below the skin and will reabsorb on their own. There will be no need for removal.  
 
9. Call the office at [phone] to report any of the following problems: Abdominal pain that is increasing in severity, heavy vaginal bleeding, drainage or separation of your incision site, temperature greater than 100.4 or persistent nausea and vomiting. 10. You should be seen in the office following your surgery. Call for an appointment if this has not already been arranged. At this appointment, the findings noted at the time of your procedure will be discussed. 11. You may remove the dressing from your incision after 12 hours. You received Toradol during your surgery. You may not take any form of NSAIDS (non steroidal anti inflammatory drugs) such as Advil, Ibuprofen, Aleve, Motrin until 2:30pm. 
 
 
DO NOT TAKE TYLENOL/ACETAMINOPHEN WITH PERCOCET TAKE NARCOTIC PAIN MEDICATIONS WITH FOOD Narcotics tend to be constipating, we suggest taking a stool softener such as Colace or Miralax (follow package instructions). DO NOT DRIVE WHILE TAKING NARCOTIC PAIN MEDICATIONS. DO NOT TAKE SLEEPING MEDICATIONS OR ANTIANXIETY MEDICATIONS WHILE TAKING NARCOTIC PAIN MEDICATIONS,  ESPECIALLY THE NIGHT OF ANESTHESIA. CPAP PATIENTS BE SURE TO WEAR MACHINE WHENEVER NAPPING OR SLEEPING. DISCHARGE SUMMARY from Nurse The following personal items collected during your admission are returned to you:  
Dental Appliance: Dental Appliances: None Vision: Visual Aid: Glasses Hearing Aid:   
Jewelry: Jewelry: None Clothing: Clothing: Other (comment) (belonging bag) Other Valuables: Other Valuables: Purse (given to ) Valuables sent to safe:   
 
 
PATIENT INSTRUCTIONS: 
 
 
F-face looks uneven A-arms unable to move or move even S-speech slurred or non-existent T-time-call 911 as soon as signs and symptoms begin-DO NOT go Back to bed or wait to see if you get better-TIME IS BRAIN. If you have not received your influenza and/or pneumococcal vaccine, please follow up with your primary care physician. The discharge information has been reviewed with the patient and spouse. The patient and spouse verbalized understanding. ??????????????????????????????????????????????????? 
 
 
??????????????????????????????????????????????????????????? ????????????????????????????????????? 
 
??????????????????????????? ??????????????????????? ????????????????????????? ? ???????? ?????????????????????????????????????????????????????????????????????????????????????????????? ????????????????????????????????????? 2-7 ???????????????????? 
 
???????????????????????????????????????????????????????????????????????????????????????????????? 
 
????????????????????????????????? ?????????????????????????????????????????????????????????????????????????????? 
 
?????????????????????????????????????????????????????????  Tylenol, Motrin ??????????????????????????????????????????? ?????????????????????????????????????????????????????? ????????????????????????????????????????????????????????????????????????????????? ??????????????????????????????????????????????????????????????????????????????????????? ??????????????????????? 24 ??????? 
 
?????????????????????????????????????????????????????????? 2-3 ??? ??????????????????????????????????????????????????????????????????????????????????????????????????????????????????? ?????????????????????????????????? 6 ?????????????????????????? 
 
????????????????????? ? ????????????????????????????????????? 
 
 ??????????????????????????????????????????????? ?????????????????????????????????????????????????? ??????????????????????????????????????????????????????????????????? ??????????????????????? 
 
???????????????? ([phone]) [id number] ?????????????????? ? ????????: ???????????????????????????????????, ????????????????????????, ??????????????????????????????????, ??????????????? 100.4 ???????????????????????????????????? 
 
????????????????????????????????????????? ??????????????????????????? ?????????????????????????????????????????????????????????????????????????????? 
 
???????????????????????????????????????????? 12 ??????? 
 
????????? Toradol ?????????????????? ???????????? NSAIDS (Non steroidal inflammatory drugs) ? ??? Advil, Ibuprofen, Aleve, Motrin ????????? 14.30 ?. 
 
 
??????? TYLENOL / ACETAMINOHEN ? ?? PERCOCET 
????????????????????????????????????????????????? 
??????????????????????????????????????????????????????????????????????? Colace ? ??? Miralax (????????????????????) 
 
?????????????????????????????????????????????????????????????????? 
 
?????????????????????????????????????????????????????????????????????????????????????????????????????????????????????????????????????????????????????? 
 
??????? CPAP ????????????????????????????????????????????????? 
 
?????????????????????? 
 
????????????????????????????????????????????????????????????????????????????????: 
???????????????: ??????????????????: ????? 
??????????: Visual Aid: Glasses ??????????????: 
?????????????: ?????????????: ????? 
????????: ????????: ???? ? (???????????????) (belong bag) ? ?????? ? : ???????????? ? : ??????????? (???????) 
??????????????????????: 
 
 
????????????????????: 
 
???????????????????????????????????????????????????????????????????????? 24 ????????????????????????????????????????????: 
????????????????????????????? 24 ??????? 
??????????????????????????????????????????????????????????????????? 
????? ????????????? 
 ???????????????: ?????????????????????????????????? ????????????????????????????????????????? 24 ???????????? 
???????????????????????????????????????????????????????????????????????? (??????????????????) ????????????????? 
????????????????????????????????????????: ?????????????????????????? ? ????????? ?????????????????????????????????????????????????????????????? 
???????????????????????????????????????? 
?????????????????????????????????????????? 
???????????????????????????? 
??????????????????????????? 8 ????????????????????????????????????????????? 
 
????????????????????????????????: 
????????????????????????????????????????? ? ???????????? 
??????????????? 100.5 
?????????????????????????????? 4 ??????????????????????????????? 
?????????????????????????????????????????????????????????????????: ?????????????????????????????????????????????????????????????????????????????? 
 
 
??????????????????? Operative Call ????????????????????????????????????????????????????????????? ??????????????????????????????????????????????????????????????? ???????????????????????????????????????????????????????????????????????????????????????????? 
 
?????????????????????????????????????? Press Ganey ?????????????????????????????????????????????????????? ???????????????????? valua Suggest an edit Kh? næan? n? k?r d?læ h?l?ng p?h??t?d d?wy kl?xngs? ?xngth?ngk?l k?hxng khu? 
 
 
khu? x?c kl?b m? r?bprath?n x?h??r t?m pkti d?? me???x x?k?r khl???ns??? h??y p? reìm t?n lxng d???m n?? xùn læa x?h??r x?xnyon 
 
h?l?k le??yng k?r yk h?r??x r?ng h?n?k kh?xy«pheìm kickrrm k?hxng khu? lxng dein læa th? kickrrm be? rxb «b? ?n k?xn khu? x?c reìm t?n nis? ?y pkti k?hxng khu? d?? h??k m??m? khw?m r?? s??k m?? s?b?y h?r??x m? le??xd h??l keid k?h??n x??ng m? n?y s??kh?? p?h??h??ing s??wn h???? s??m?rt?h kl?b p? th?ng?n p?h?yn? 2-7 w?n h?l?ngc?k k?h?n txn n?? 
 
k?r m? phe?? s? ? mph?n?h? ca d?nein t?x p? d?? doy r?w th??s?ud thè? th?? t? xngk?r h??k khw?m r?? s??k m?? s?b?y h?l?ng k?r p?h??t?d ld lng 
 
khu? x?c ch?? wel? x?b n?? h?r??x x??ngx?bn?? nxkc?k n?? y?ng plxdp?h?y th?? ca ??y n?? h?r??x ch?? x??ng n?? r?xn me???x khu? r??s??k t?h?ng s? ìng h?el?? n?? 
 
k?r ld takhriw n? ch?xng t?xng s??wn l??ng m?k keid k?h??n h?l?ngc?k k?h?n txn n?? Tylenol, Motrin h?r??x y? kæ? pwd th?? khu? k?h?nd khwr ld x?k?r m?? s?b?y n?? khu? khwr s??ngket h??n khw?m kh??b h?n?? n? k?r pwd me? ??xy n? w?n rùng k?h??n nxkc?k n?? y?ng m?? keid x?k?r m?? s?b?y t?m s?? khorng k?hxng khu? h?r??x s??ngket khw?m r?? s??k c?b khx h?r??x h??l? n?? p?n pher?a ??s th?? ch?? n? rah? ??ng k?r p?h??t?d phe???x ch?wy h? ?? phæthy? h??n xw?ywa n? xûng cheingkr?n k?hxng khu? ??s s??m?rt?h d?d s?m d?? tlxd 24 ch??wmong 
 
m?n m?? d?? p?n re? ??xng p?hid pkti th?? m? ch?xng khlxd cud c?næk p?n wel? n?n 2-3 w?n p?nk?r y?k th?? ca kh?d de? d?? me???x ch?wng wel? m? prac?de? ?xn khr?ng t?h?d p? ca keid k?h? ?n ne???xngc?k r??ngk?y k?hxng khu? d?? r?b khw?mkher?yd th?? s??kh?? gonzalo wel? k?hxng khu? khwr khwbkhum t?w xeng p?h?yn? 6 s??pd?h?? ræk h?l?ng k?r d?nein k?r 
 
rxy c?? x?c pr?kt? k?h??n rxb «rxy b?k læa ca kh?xy«kæ? p??h?? t?m th?? ye?ywy? rxy y?b th?? ch?? n? k?r pid rxy b?k x?c pr?kt? h?en? ?x p?hiwh?n?ng h??k p?n chèn n?n ca t?h?k n? xxk n? k?r k?hê? chm s??n?kng?n k?hxng khu? x??ngr?k?t?m rxy t?x th?? th? b?xy«ca xy?? t?? p?hiwh?n?ng læa ca d?d s?m kl?bkh??n m? xeng m?? c?p?n t?xng m? k?r k?c?d 
 
thor h?? th?? s??n?kng?n [phone] phe???x r?yng?n p??h?? d? «t?x p? n??: X?k?r pwd t?xngth? ? pheìm k?h??n x??ng runræng, m? le??xd xxk th?ng ch?xng khlxd m?k, k?r rab?y n?? h?r??x k?r yæk ph???nth?? k?hxng p?hæl, kaur?h?p?h?mi s? ?ng k?? 100.4 H?r??x x?k?r khl???ns??? x?ce? yn læa b?xy khr?ng 
 
khu? khwr ca d?? h??n n? xxffi?? t?m k?r p?h??t?d k?hxng khu? thor n?d h??k y?ng m?? d?? c?d ter?ym n? k?r n?dh?m?y n?? ca m? k?r kl??w t?h?ng p?hl k?r wic?y th?? rabu w?? n? ch?wng wel? k?hxng krabwnk?r k?hxng khu? 
 
khu? x?c t?hxd n?? s?l?d xxk c?k p?hæl k?hxng khu? h?l?ngc?k p?h??n p? 12 ch??wmong 
 
khu? d?? r?b Toradol n? rah? ??ng k?r p?h??t?d khu? m?? khwr ch?? NSAIDS (Non steroidal inflammatory drugs) chèn Advil, Ibuprofen, Aleve, Motrin cnt?h?ng wel? 14.30 N. X?? ch?? TYLENOL/ ACETAMINOHEN k?b PERCOCET 
ch?? y? r?ks? ?? rokh mar?ng n? kraphe?a x?h??r th?? m??m? prayochn? 
y? s?eph tid m? næwnôm th?? ca p?n x?k?r t?xngp?h?k re? k?hx næan? h??? ch?? n?? y? pr?b s? t?n chèn Colace h?r??x Miralax (th? t?m kh? næan? n? phækh ke) h???m r?bprath?n k?h?a th?? ch?? y? r?ks? ?? rokh mar?ng th?? ke??yw k?b rokh mar?ng p?hiwh?n?ng 
 
h???m ch?? y? r?ks? ?? rokh nxn h?l?b h?r??x y? r?ks? ?? rokh h?xbh???d n? k?h?a th??th?k?r r?ks??? d?wy y? r?ks? ?? rokh mar?ng th?? ke??yw k?b rokh mar?ng p?hiwh?n?ng doy c?heph?a x??ng yìng y?m kh??kh??n x?n ?? s?m??s?emx 
 
p?h?? p?wy CPAP khwr ch?? kher? ??xng me???x d? k?t?m th?? k?r phe?a h?r??x k?r nxn h?l?b 
 
s?rup s? ?ra s? ?kh?? c?k phy?b?l 
 
r?yk?r s??wn bukhkhl t?x p? n?? th?? rwbrwm rah? ??ng k?r r?b k?hê? re?yn k?hxng khu? ca t?h?k s??ng kl?b m?y?ng khu?: 
Clarisse Doyle?? th?nt krrm: Darletta Dancer? ??xng ch?? th?nt krrm: M??m? 
wis? ?y th???n?: Visual Aid: Glasses 
kher? ??xng ch?wy f?ng: 
Darletta Dancer? ??xngprad? b: Darletta Dancer? ??xngprad?b: M??m? 
s?e? ??xp???: S?e???xp???: X???n « (s?ædng khw?m khidh??n) (belong bag) 
kh?? x???n « : K?hxng m? kh?? x???n « : Krap?? ngein (h??? s??m?) 
k? hxng m? kh?? s??ng p? y?ng t?? sef: 
 
 
Kh? næan? s??h?r?b p?h?? p?wy: 
 
H?l?ngc?k k?r rang?b khw?m r?? s??k th??wp? h?r??x k?r rang?b khw?m r?? s??k h?lxdle? ?xd d? p?n wel?  24 ch??wmong h?r??x n? k?h?a th?? ch?? y? s?eph tidt?m b?s???ng phæthy?: 
Kh?r b?ng khn khwr xy?? k?b khu? t?x p? x?k 24 ch??wmong 
phe???x khw?m plxdp?h?y k?hxng khu? xeng p?h??h???? th?? m? khw?m r?bp?hidchxb t?xng k?h?b rt?h kl?b b??n 
c?k?d kickrrm k?hxng khu? 
 kickrrm th?? næan?: Ph?k w?n n?? d?wy khw?m ch?wyh?el? ?x n? w?n n?? x?? p?n k?h? ?n b?nd? h?r??x x?b n?? doy m?? t?xng s??? n? 24 ch??wmong t?h?d p? 
pord reìm t?n d?wy x?h??r th?? nùmnwl x?xn nùm læa m? khw?m k??wh?n?? p?n th?? yxmr?b d?? (m??m? x?k?r khl???ns???) t?x x?h??r p?n prac? h??k khu? m? x?k?r c?b khx khu? khwr lxng s? ìng t?x p? n??: K?hxngh? elw h?r??x n??s??ms? ?ych? xùn «h?r??x khxh?xy h??k m?? d? k?h??n h?l?ngc?k p?h??n p? h?l?y w?n pord tidt?m p?hl k?b phæthy? h?l?k k?hxng khu? 
h???m k?h?b rt?h læa ch?? kher? ??xngc?kr th?? p?n x?ntr?y 
x?? th?k?r t?ds?inc? s? ? wnt?w h?r??x ?hurkic th?? s??kh?? 
x?? d???m kher? ??xng d???m xælkx?xl? 
h??k khu? y?ng m?? d?? p?s?s? ?wa p?h?yn? 8 ch??wmong h?l?ngkh lxd pord tidt?x ? ? ?lyphæthy? th?ng thor? ??phth? r?yng?n t?x p? n?? h??? ???lyphæthy? k?hxng khu?: 
Pwd m?k bwm dæng h?r??x m? klìn h?em?n h?r? ?x briwe? rxb «briwe? p?h??t?d 
kaur?h?p?h?mi s? ?ng k?? 100.5 Khl???ns??? x?ce? yn p?n wel? n?n k?? 4 ch??wmong h?r??x t??? m?? s??m?rt?h ch?? y? d?? 
s?????? k?hxng k?r h??l we?yn th?? ld lng h?r??x k?r d?xy kh?? k?hxng s?ên pras? ?th p? y?ng s?udk?h?d: K?r pel? ?ynpælng s?? khw?m chùmch? ??n t?h?wr khw?m r?? s??k s?e?yw s?? y?n h?r??x khw?m c?bpwd pheìm k?h??n 
 
 
khu? ca d?? r?b k?r phos?t? Operative Call c?k phy?b?l h??xng ph?kf???n n? w?n rùng k?h??n h?l?ng k?r p?h??t?d phe???x trwc s?xb khu? p?n s? ìng s??kh?? m?k th?? re? ca r?? ?? khu? f???nt?wx? ?ng r? h?l?ngc?k k?r p?h??t?d h??k khu? m? p??h?? h?r??x t?xngk?r ph?d khuy k?b kh?r s??k khn pord thor p? h?? ???lyphæthy? k?hxng khu? x?? rx s??y th??th?k?r p?rs????y? 
 
khu? x?c d?? r?b x?felicia h?r??x cdh?m?y n? cdh?m?y c?k Press Ganey ke? ?yw k?b pras?bk?r?? k?hxng khu? k?b re? n? h??wy p?h??t?d p?h?? p?wy nxk khw?m khidh??n k?hxng khu? kh??x valua Chart Review Routing History No Routing History on File

## 2017-03-13 NOTE — ANESTHESIA POSTPROCEDURE EVALUATION
Post-Anesthesia Evaluation and Assessment    Patient: Mariela Angel MRN: [id number]  SSN: [id number]    YOB: 1970  Age: 50 y.o. Sex: female       Cardiovascular Function/Vital Signs  Visit Vitals    /74    Pulse 72    Temp 36.4 °C (97.6 °F)    Resp 16    Ht 5' 3\" (1.6 m)    Wt 52.2 kg (115 lb)    SpO2 100%    BMI 20.37 kg/m2       Patient is status post general anesthesia for Procedure(s):  DIAGNOSTIC LAPAROSCOPY. Nausea/Vomiting: None    Postoperative hydration reviewed and adequate. Pain:  Pain Scale 1: FACES (03/13/17 0942)  Pain Intensity 1: 6 (03/13/17 0942)   Managed:   pain less at time of signout    Neurological Status:   Neuro (WDL): Exceptions to [facility] (03/13/17 0805)  Neuro  Neurologic State: Sleeping;Eyes open to voice (03/13/17 0922)  LUE Motor Response: Purposeful (03/13/17 0922)  LLE Motor Response: Purposeful (03/13/17 0922)  RUE Motor Response: Purposeful (03/13/17 8747)  RLE Motor Response: Purposeful (03/13/17 3521)   At baseline    Mental Status and Level of Consciousness: Arousable    Pulmonary Status:   O2 Device: Nasal cannula (03/13/17 0927)   Adequate oxygenation and airway patent    Complications related to anesthesia: None    Post-anesthesia assessment completed.  No concerns    Signed By: Ryan Gilbert MD     March 13, 2017

## 2017-07-18 ENCOUNTER — OFFICE VISIT (OUTPATIENT)
Dept: GYNECOLOGY | Age: 47
End: 2017-07-18

## 2017-07-18 ENCOUNTER — NURSE NAVIGATOR (OUTPATIENT)
Dept: ONCOLOGY | Age: 47
End: 2017-07-18

## 2017-07-18 VITALS
HEIGHT: 63 IN | HEART RATE: 82 BPM | SYSTOLIC BLOOD PRESSURE: 104 MMHG | BODY MASS INDEX: 20.62 KG/M2 | DIASTOLIC BLOOD PRESSURE: 71 MMHG | WEIGHT: 116.4 LBS

## 2017-07-18 DIAGNOSIS — N83.8 OVARIAN MASS, LEFT: ICD-10-CM

## 2017-07-18 DIAGNOSIS — R97.1 ELEVATED CA-125: ICD-10-CM

## 2017-07-18 DIAGNOSIS — R10.2 PELVIC PAIN IN FEMALE: Primary | ICD-10-CM

## 2017-07-18 DIAGNOSIS — D25.1 INTRAMURAL LEIOMYOMA OF UTERUS: ICD-10-CM

## 2017-07-18 RX ORDER — ATENOLOL 50 MG/1
TABLET ORAL
COMMUNITY
Start: 2017-05-11

## 2017-07-18 NOTE — PATIENT INSTRUCTIONS
[facility] Activation    Thank you for requesting access to [facility]. Please follow the instructions below to securely access and download your online medical record. [facility] allows you to send messages to your doctor, view your test results, renew your prescriptions, schedule appointments, and more. How Do I Sign Up? 1. In your internet browser, go to [email or web address]. 2. Click on the First Time User? Click Here link in the Sign In box. You will see the New Member Sign Up page. 3. Enter your Kingtop Access Code exactly as it appears below. You will not need to use this code after youve completed the sign-up process. If you do not sign up before the expiration date, you must request a new code. [facility]top Access Code: [email or web address]  Expires: 10/16/2017  2:58 PM (This is the date your [facility]top access code will )    4. Enter the last four digits of your Social Security Number (xxxx) and Date of Birth (mm/dd/yyyy) as indicated and click Submit. You will be taken to the next sign-up page. 5. Create a Kingtop ID. This will be your [facility]top login ID and cannot be changed, so think of one that is secure and easy to remember. 6. Create a Kingtop password. You can change your password at any time. 7. Enter your Password Reset Question and Answer. This can be used at a later time if you forget your password. 8. Enter your e-mail address. You will receive e-mail notification when new information is available in [address]. 9. Click Sign Up. You can now view and download portions of your medical record. 10. Click the Download Summary menu link to download a portable copy of your medical information. Additional Information    If you have questions, please visit the Frequently Asked Questions section of the Kingtop website at [email or web address] Remember, [facility] is NOT to be used for urgent needs. For medical emergencies, dial 911.

## 2017-07-18 NOTE — PROGRESS NOTES
Yossi Leung  : 1970  Elevated CA-125, history of Endometriosis      I met with Brody and her partner, Edilson Ospina following her pelvic examination today with Dr. Chrissy Christopher. The patient is from [facility] and has resided in the [place] for 11 years. She is able to comprehend some [place] and she is able to speak limited [place]. [employer] was accessed today to assist with interpretation and translation. She had a pleasant affect and Jose Banks is able to communicate with the patient in brief sentences. Dr. Chrissy Christopher explained that she has endometriosis and that could be the cause of a rising CA-125 and not cancer. However, he recommended a laparotomy procedure to explore where he could determine the cause of the elevated tumor marker. Through the , the patient communicated that she would prefer to wait at least 6 months prior to having another surgery. Jose Banks stated they had not recovered financially from the previous surgery and that the deductible and co-payments had been a hardship for the family. Dr. Chrissy Christopher communicated that while he does not believe this is cancer, he can not determine that without surgery. The patient and Jose Banks requested a repeat of the  and Dr. Chrissy Christopher agreed that if the  rises during the two month period, they will need to do surgery and that was an agreeable plan for the couple. Dr. Chrissy Christopher explained that a hysterectomy would be indicated if it was cancer, and that a hysterectomy was the surgical intervention for endometriosis. They verbalized understanding of this treatment plan. I explained my role as an ONN and provided my contact information. I have provided Jose Banks with the telephone number for [facility] and also, the telephone number for co-payment and deductible assistance. He was appreciative of this support today and will contact me with additional concerns or questions. The patient is insured.  [facility] is concerned about being out of work. 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 This was the rationale provided for desiring to delay the surgery for 6-12 months. The couple felt they needed to save money and pay off the debt they incurred due to the recent surgery. F/u appointment to be scheduled for 2 months. Patient was given requisition for  labwork to be drawn at [facility]. Patient provided with a map of the various locations for [place] [facility]:    -Explain role as ONN and provide patient with contact information.  -Provide patient/mate with contact information for [facility] and financial assistance for co-payment and deductibles.  -On going patient education.  -Financial barriers, limited resources and dependent upon both incomes for this household.  -Language barrier/Cultural barrier. Partner is a [employer] and is very supportive and able to communicate on a limited basis with patient.  -Encourage them to contact MD/Nurse/ONN with additional questions or concerns.       Patito Morris, RN, BSN, [facility], [facility]    Oncology Nurse Navigator

## 2017-07-18 NOTE — PROGRESS NOTES
[address] [place] [address] Mathias Moritz [address]23, 4[address] [facility]  [phone]  MD Isac Jacobson MD Sherial Bulls, NP    Office Note  Patient ID:  Name:  Amairani Donis  MRN:  [id number]  :  1970/47 y.o. Date:  2017      HISTORY OF PRESENT ILLNESS:  Amairani Donis is a 50 y.o.  perimenopausal female who is being seen for a left ovarian mass, pelvic pain, and a rising CA-125. She is referred by Dr. Haseeb Ashford. She was noted to have a left ovarian mass back in late  when she presented for evaluation of pelvic pain. A CA-125 at that time was mildly elevated. She went to [facility] for a few months before returning for repeat evaluation. The ovarian mass had decreased in size, but her CA-125 was a little bit higher. In 2017 she was taken to the OR by Dr. Anna José for laparoscopic evaluation. The plan was to perform a left salpingooopohorectomy. At the time of surgery she was noted to have a normal appearing uterus, but there were significant bowel and omental adhesions that completely covered and precluded visualization of the left ovary. There also appeared to be a right hydrosalpinx. Overall findings were consistent with pelvic endometriosis. The procedure was aborted at that time. She is now referred to me for surgical consultation. A  service was used for her visit due to her limited [place]. She and her  are concerned about the cost of surgery, as they are still paying off the last surgery. ROS:   and GI review: Negative  Cardiopulmonary review:Negative   Musculoskeletal:  Negative    A comprehensive review of systems was negative except for that written in the History of Present Illness. , 10 point ROS    OB/GYN ROS:    Prior Lsc demonstrating pelvic endometriosis  Patient denies any abnormal bleeding or vaginal discharge.          Problem List:  Patient Active Problem List    Diagnosis Date Noted    Ovarian mass, left 07/18/2017    Pelvic pain in female 03/11/2017    Leiomyoma of body of uterus 03/11/2017    Elevated CA-125 03/11/2017     PMH:  Past Medical History:   Diagnosis Date    Environmental allergies     Ill-defined condition     blood disease (thalalassemia)    Thyroid disease       PSH:  History reviewed. No pertinent surgical history. Social History:  Social History   Substance Use Topics    Smoking status: Never Smoker    Smokeless tobacco: Never Used    Alcohol use No      Family History:  History reviewed. No pertinent family history. Medications: (reviewed)  Current Outpatient Prescriptions   Medication Sig    atenolol (TENORMIN) 50 mg tablet     ibuprofen (MOTRIN) 400 mg tablet Take 1 Tab by mouth every six (6) hours as needed for Pain.  methIMAzole (TAPAZOLE) 5 mg tablet Take 5 mg by mouth two (2) times a day. Indications: hyperthyroidism    cetirizine (ZYRTEC) 10 mg tablet Take 10 mg by mouth daily. Indications: ALLERGIC RHINITIS    acetaminophen (TYLENOL) 325 mg tablet Take 325 mg by mouth every four (4) hours as needed for Pain. Indications: Pain    oxyCODONE-acetaminophen (PERCOCET) 5-325 mg per tablet Take 1-2 Tabs by mouth every four (4) hours as needed for Pain. Max Daily Amount: 12 Tabs. No current facility-administered medications for this visit. Allergies: (reviewed)  No Known Allergies       OBJECTIVE:    Physical Exam:  VITAL SIGNS: Vitals:    07/18/17 1457   BP: 104/71   Pulse: 82   Weight: 52.8 kg (116 lb 6.4 oz)   Height: 5' 2.99\" (1.6 m)     Body mass index is 20.62 kg/(m^2). GENERAL KAZ: Conversant, alert, oriented. No acute distress. HEENT: HEENT. No thyroid enlargement. No JVD. Neck: Supple without restrictions. RESPIRATORY: Clear to auscultation and percussion to the bases. No CVAT. CARDIOVASC: RRR without murmur/rub.    GASTROINT: soft, non-tender, without masses or organomegaly   MUSCULOSKEL: no joint tenderness, deformity or swelling   EXTREMITIES: extremities normal, atraumatic, no cyanosis or edema   PELVIC: Vulva and vagina appear normal. Bimanual exam reveals normal uterus and adnexa. RECTAL: Deferred   LALA SURVEY: No suspicious lymphadenopathy or edema noted. NEURO: Grossly intact. No acute deficit. Imaging:  Multiple outside pelvic ultrasounds [facility] reviewed. Pertinent findings noted in HPI. IMPRESSION/PLAN:  Judy Valdes is a 50 y.o. female with a working diagnosis of pelvic pain and a left adnexal mass, presumably secondary to endometriosis. I reviewed with Judy Valdes her medical records, physical exam, and review of symptoms. I explained that the rising CA-125 is most likely due to endometriosis, but malignancy cannot be excluded without pathology. I recommended laparoscopic evaluation with resection of the mass, laparoscopic hysterectomy, removal of the contralateral ovary, lysis of adhesions, and resection of any endometriosis implants. She does not want to have surgery at this time. I again explained that I cannot exclude malignancy without pathology. After some discussion, we decided to compromise and we will repeat a CA-125 in a couple of months. If the CA-125 continues to rise, we may need to consider surgery at that point.       Signed By: Henrik Elias MD     7/18/2017/3:25 PM

## 2017-07-18 NOTE — MR AVS SNAPSHOT
Visit Information Date & Time Provider Department Dept. Phone Encounter #  
 7/18/2017  3:00 PM Nikki Denney MD [facility] Gynecologic Oncology [phone] [id number] Upcoming Health Maintenance Date Due DTaP/Tdap/Td series (1 - Tdap) 3/6/1989 PAP AKA CERVICAL CYTOLOGY 3/6/1989 INFLUENZA AGE 9 TO ADULT 8/1/2017 Allergies as of 7/18/2017  Review Complete On: 7/18/2017 By: Louis Hernandez MD  
 No Known Allergies Current Immunizations  Never Reviewed No immunizations on file. Not reviewed this visit You Were Diagnosed With   
  
 Codes Comments Pelvic pain in female    -  Primary ICD-10-CM: R10.2 ICD-9-CM: 625.9 Intramural leiomyoma of uterus     ICD-10-CM: D25.1 ICD-9-CM: 218.1 Elevated CA-125     ICD-10-CM: R97.1 ICD-9-CM: 795.82 Ovarian mass, left     ICD-10-CM: N83.9 ICD-9-CM: 620.9 Vitals BP Pulse Height(growth percentile) Weight(growth percentile) LMP BMI  
 104/71 (BP 1 Location: Right arm, BP Patient Position: Sitting) 82 5' 2.99\" (1.6 m) 116 lb 6.4 oz (52.8 kg) 02/07/2017 (LMP Unknown) 20.62 kg/m2 OB Status Smoking Status Postmenopausal Never Smoker BMI and BSA Data Body Mass Index Body Surface Area  
 20.62 kg/m 2 1.53 m 2 Your Updated Medication List  
  
   
This list is accurate as of: 7/18/17  4:07 PM.  Always use your most recent med list.  
  
  
  
  
 atenolol 50 mg tablet Commonly known as:  TENORMIN  
  
 ibuprofen 400 mg tablet Commonly known as:  MOTRIN Take 1 Tab by mouth every six (6) hours as needed for Pain. methIMAzole 5 mg tablet Commonly known as:  TAPAZOLE Take 5 mg by mouth two (2) times a day. Indications: hyperthyroidism  
  
 oxyCODONE-acetaminophen 5-325 mg per tablet Commonly known as:  PERCOCET Take 1-2 Tabs by mouth every four (4) hours as needed for Pain. Max Daily Amount: 12 Tabs. TYLENOL 325 mg tablet Generic drug:  acetaminophen Take 325 mg by mouth every four (4) hours as needed for Pain. Indications: Pain ZyrTEC 10 mg tablet Generic drug:  cetirizine Take 10 mg by mouth daily. Indications: ALLERGIC RHINITIS We Performed the Following CANCER ANTIGEN 125 [99010 CPT(R)] Patient Instructions MyChart Activation Thank you for requesting access to [employer]. Please follow the instructions below to securely access and download your online medical record. [employer] allows you to send messages to your doctor, view your test results, renew your prescriptions, schedule appointments, and more. How Do I Sign Up? 1. In your internet browser, go to [email or web address]. 2. Click on the First Time User? Click Here link in the Sign In box. You will see the New Member Sign Up page. 3. Enter your [employer] Access Code exactly as it appears below. You will not need to use this code after youve completed the sign-up process. If you do not sign up before the expiration date, you must request a new code. [employer] Access Code: [email or web address] Expires: 10/16/2017  2:58 PM (This is the date your [employer] access code will ) 4. Enter the last four digits of your Social Security Number (xxxx) and Date of Birth (mm/dd/yyyy) as indicated and click Submit. You will be taken to the next sign-up page. 5. Create a [employer] ID. This will be your [employer] login ID and cannot be changed, so think of one that is secure and easy to remember. 6. Create a [employer] password. You can change your password at any time. 7. Enter your Password Reset Question and Answer. This can be used at a later time if you forget your password. 8. Enter your e-mail address. You will receive e-mail notification when new information is available in [address] 9. Click Sign Up. You can now view and download portions of your medical record. 10. Click the Download Summary menu link to download a portable copy of your medical information. Additional Information If you have questions, please visit the Frequently Asked Questions section of the [employer] website at [email or web address] Remember, [employer] is NOT to be used for urgent needs. For medical emergencies, dial 911. Introducing [facility]! Julianna Stokes introduces [employer] patient portal. Now you can access parts of your medical record, email your doctor's office, and request medication refills online. 1. In your internet browser, go to [email or web address] 2. Click on the First Time User? Click Here link in the Sign In box. You will see the New Member Sign Up page. 3. Enter your [employer] Access Code exactly as it appears below. You will not need to use this code after youve completed the sign-up process. If you do not sign up before the expiration date, you must request a new code. · [employer] Access Code: [email or web address] Expires: 10/16/2017  2:58 PM 
 
4. Enter the last four digits of your Social Security Number (xxxx) and Date of Birth (mm/dd/yyyy) as indicated and click Submit. You will be taken to the next sign-up page. 5. Create a [employer] ID. This will be your [employer] login ID and cannot be changed, so think of one that is secure and easy to remember. 6. Create a [employer] password. You can change your password at any time. 7. Enter your Password Reset Question and Answer. This can be used at a later time if you forget your password. 8. Enter your e-mail address. You will receive e-mail notification when new information is available in [address] 9. Click Sign Up. You can now view and download portions of your medical record. 10. Click the Download Summary menu link to download a portable copy of your medical information.  
 
If you have questions, please visit the Frequently Asked Questions section of the [employer]. Remember, [employer] is NOT to be used for urgent needs. For medical emergencies, dial 911. Now available from your iPhone and Android! Please provide this summary of care documentation to your next provider. Your primary care clinician is listed as NONE. If you have any questions after today's visit, please call [phone].

## 2017-07-18 NOTE — LETTER
2017 5:10 PM 
 
Patient:  Dell Holstein YOB: 1970 Date of Visit: 2017 Dear Javy Mustafa MD 
[address] [facility] [address] [address] VIA Facsimile: [phone] 
 : 
 
 
Thank you for referring Ms. Dell Holstein to me for evaluation/treatment. Below are the relevant portions of my assessment and plan of care. Referred by Dr. Corinna Greenberg for left ovarian cyst and elevated ca-125. [address], [address] [place], [address] 
[phone] Isai Arzola MD   Dayana Patient, MD Winifred Amezcua, TEJAL Office Note Patient ID: 
Name:  Dell Holstein MRN:  [id number] :  1970/47 y.o. Date:  2017 HISTORY OF PRESENT ILLNESS: 
Dell Holstein is a 50 y.o.  perimenopausal female who is being seen for a left ovarian mass, pelvic pain, and a rising CA-125. She is referred by Dr. Royal Diego. She was noted to have a left ovarian mass back in late  when she presented for evaluation of pelvic pain. A CA-125 at that time was mildly elevated. She went to [facility] for a few months before returning for repeat evaluation. The ovarian mass had decreased in size, but her CA-125 was a little bit higher. In 2017 she was taken to the OR by Dr. Hamzah Duffy for laparoscopic evaluation. The plan was to perform a left salpingooopohorectomy. At the time of surgery she was noted to have a normal appearing uterus, but there were significant bowel and omental adhesions that completely covered and precluded visualization of the left ovary. There also appeared to be a right hydrosalpinx. Overall findings were consistent with pelvic endometriosis. The procedure was aborted at that time. She is now referred to me for surgical consultation. A  service was used for her visit due to her limited [place]. She and her  are concerned about the cost of surgery, as they are still paying off the last surgery. ROS: 
 and GI review: Negative Cardiopulmonary review:Negative Musculoskeletal:  Negative A comprehensive review of systems was negative except for that written in the History of Present Illness. , 10 point ROS 
 
OB/GYN ROS: 
 Prior Lsc demonstrating pelvic endometriosis Patient denies any abnormal bleeding or vaginal discharge. Problem List: 
Patient Active Problem List  
 Diagnosis Date Noted  Ovarian mass, left 2017  Pelvic pain in female 2017  Leiomyoma of body of uterus 2017  Elevated CA-125 2017 PMH: 
Past Medical History:  
Diagnosis Date  Environmental allergies  Ill-defined condition   
 blood disease (thalalassemia)  Thyroid disease PSH: 
History reviewed. No pertinent surgical history. Social History: 
Social History Substance Use Topics  Smoking status: Never Smoker  Smokeless tobacco: Never Used  Alcohol use No  
  
Family History: 
History reviewed. No pertinent family history. Medications: (reviewed) Current Outpatient Prescriptions Medication Sig  
 atenolol (TENORMIN) 50 mg tablet  ibuprofen (MOTRIN) 400 mg tablet Take 1 Tab by mouth every six (6) hours as needed for Pain.  methIMAzole (TAPAZOLE) 5 mg tablet Take 5 mg by mouth two (2) times a day. Indications: hyperthyroidism  cetirizine (ZYRTEC) 10 mg tablet Take 10 mg by mouth daily. Indications: ALLERGIC RHINITIS  acetaminophen (TYLENOL) 325 mg tablet Take 325 mg by mouth every four (4) hours as needed for Pain. Indications: Pain  oxyCODONE-acetaminophen (PERCOCET) 5-325 mg per tablet Take 1-2 Tabs by mouth every four (4) hours as needed for Pain. Max Daily Amount: 12 Tabs. No current facility-administered medications for this visit. Allergies: (reviewed) No Known Allergies OBJECTIVE: 
 
Physical Exam: VITAL SIGNS: Vitals:  
 07/18/17 1457 BP: 104/71 Pulse: 82 Weight: 52.8 kg (116 lb 6.4 oz) Height: 5' 2.99\" (1.6 m) Body mass index is 20.62 kg/(m^2). GENERAL KAZ: Conversant, alert, oriented. No acute distress. HEENT: HEENT. No thyroid enlargement. No JVD. Neck: Supple without restrictions. RESPIRATORY: Clear to auscultation and percussion to the bases. No CVAT. CARDIOVASC: RRR without murmur/rub. GASTROINT: soft, non-tender, without masses or organomegaly MUSCULOSKEL: no joint tenderness, deformity or swelling EXTREMITIES: extremities normal, atraumatic, no cyanosis or edema PELVIC: Vulva and vagina appear normal. Bimanual exam reveals normal uterus and adnexa. RECTAL: Deferred LALA SURVEY: No suspicious lymphadenopathy or edema noted. NEURO: Grossly intact. No acute deficit. Imaging: 
Multiple outside pelvic ultrasounds from [facility] reviewed. Pertinent findings noted in HPI. IMPRESSION/PLAN: 
Neo Salazar is a 50 y.o. female with a working diagnosis of pelvic pain and a left adnexal mass, presumably secondary to endometriosis. I reviewed with Neo Salazar her medical records, physical exam, and review of symptoms. I explained that the rising CA-125 is most likely due to endometriosis, but malignancy cannot be excluded without pathology. I recommended laparoscopic evaluation with resection of the mass, laparoscopic hysterectomy, removal of the contralateral ovary, lysis of adhesions, and resection of any endometriosis implants. She does not want to have surgery at this time. I again explained that I cannot exclude malignancy without pathology. After some discussion, we decided to compromise and we will repeat a CA-125 in a couple of months. If the CA-125 continues to rise, we may need to consider surgery at that point.  
 
 
Signed By: Louis Hernandez MD   
 7/18/2017/3:25 PM  
 
 
 
 If you have questions, please do not hesitate to call me. I look forward to following Ms. Omayra Valdez along with you.  
 
 
 
Sincerely, 
 
 
Micah Howell MD

## 2017-09-20 LAB — CANCER AG125 SERPL-ACNC: 37.7 U/ML (ref 0–38.1)

## 2017-09-26 ENCOUNTER — OFFICE VISIT (OUTPATIENT)
Dept: GYNECOLOGY | Age: 47
End: 2017-09-26

## 2017-09-26 VITALS
DIASTOLIC BLOOD PRESSURE: 83 MMHG | HEART RATE: 76 BPM | SYSTOLIC BLOOD PRESSURE: 119 MMHG | WEIGHT: 118.5 LBS | BODY MASS INDEX: 21 KG/M2 | HEIGHT: 63 IN

## 2017-09-26 DIAGNOSIS — R10.2 PELVIC PAIN IN FEMALE: ICD-10-CM

## 2017-09-26 DIAGNOSIS — D25.1 INTRAMURAL LEIOMYOMA OF UTERUS: ICD-10-CM

## 2017-09-26 DIAGNOSIS — N83.8 OVARIAN MASS, LEFT: Primary | ICD-10-CM

## 2017-09-26 DIAGNOSIS — R97.1 ELEVATED CA-125: ICD-10-CM

## (undated) DEVICE — 3M™ TEGADERM™ TRANSPARENT FILM DRESSING FRAME STYLE, 1624W, 2-3/8 IN X 2-3/4 IN (6 CM X 7 CM), 100/CT 4CT/CASE: Brand: 3M™ TEGADERM™

## (undated) DEVICE — SUT CHRMC 0 27IN CT1 BRN --

## (undated) DEVICE — SURGICAL PROCEDURE PACK GYN LAPAROSCOPY CUST SMH LF

## (undated) DEVICE — BLUNT TROCAR WITH THREADED ANCHOR: Brand: VERSAONE

## (undated) DEVICE — (D)PREP SKN CHLRAPRP APPL 26ML -- CONVERT TO ITEM 371833

## (undated) DEVICE — REM POLYHESIVE ADULT PATIENT RETURN ELECTRODE: Brand: VALLEYLAB

## (undated) DEVICE — TOWEL,OR,DSP,ST,BLUE,STD,2/PK,40PK/CS: Brand: MEDLINE

## (undated) DEVICE — SHEAR HARMONIC ACET 5MMX36CM -- ACE PLUS

## (undated) DEVICE — TRAY CATH SIL 16FR 10 CA STATLOK

## (undated) DEVICE — SUTURE MCRYL SZ 4 0 L18IN ABSRB VLT PS 1 L24MM 3 8 CIR REV Y682H

## (undated) DEVICE — CONTINU-FLO SOLUTION SET, 2 INJECTION SITES, MALE LUER LOCK ADAPTER WITH RETRACTABLE COLLAR, LARGE BORE STOPCOCK WITH ROTATING MALE LUER LOCK EXTENSION SET, 2 INJECTION SITES, MALE LUER LOCK ADAPTER WITH RETRACTABLE COLLAR: Brand: INTERLINK/CONTINU-FLO

## (undated) DEVICE — KIT INFECTION CTRL ST FRAN --

## (undated) DEVICE — SOLUTION LACTATED RINGERS INJECTION USP

## (undated) DEVICE — SOLUTION IV 1000ML 0.9% SOD CHL

## (undated) DEVICE — STERILE POLYISOPRENE POWDER-FREE SURGICAL GLOVES: Brand: PROTEXIS

## (undated) DEVICE — BLADELESS OPTICAL TROCAR WITH FIXATION CANNULA: Brand: VERSAPORT

## (undated) DEVICE — UNIVERSAL FIXATION CANNULA: Brand: VERSAONE

## (undated) DEVICE — TRAY PREP DRY W/ PREM GLV 2 APPL 6 SPNG 2 UNDPD 1 OVERWRAP

## (undated) DEVICE — PREP PAD BNS: Brand: CONVERTORS

## (undated) DEVICE — LIGHT HANDLE: Brand: DEVON

## (undated) DEVICE — DERMABOND SKIN ADH 0.7ML -- DERMABOND ADVANCED 12/BX

## (undated) DEVICE — SPECIMEN RETRIEVAL POUCH: Brand: ENDO CATCH GOLD

## (undated) DEVICE — SUTURE SZ 0 27IN 5/8 CIR UR-6  TAPER PT VIOLET ABSRB VICRYL J603H

## (undated) DEVICE — GOWN,SIRUS,FABRNF,XL,20/CS: Brand: MEDLINE

## (undated) DEVICE — Device

## (undated) DEVICE — KENDALL DL ECG CABLE AND LEAD WIRE SYSTEM, 3-LEAD, SINGLE PATIENT USE: Brand: KENDALL